# Patient Record
Sex: FEMALE | Race: WHITE | NOT HISPANIC OR LATINO | Employment: UNEMPLOYED | URBAN - METROPOLITAN AREA
[De-identification: names, ages, dates, MRNs, and addresses within clinical notes are randomized per-mention and may not be internally consistent; named-entity substitution may affect disease eponyms.]

---

## 2017-03-28 ENCOUNTER — PROCEDURE VISIT (OUTPATIENT)
Dept: NEUROLOGY | Facility: CLINIC | Age: 32
End: 2017-03-28

## 2017-03-28 DIAGNOSIS — M79.642 BILATERAL HAND PAIN: Primary | ICD-10-CM

## 2017-03-28 DIAGNOSIS — M79.641 BILATERAL HAND PAIN: Primary | ICD-10-CM

## 2017-03-28 PROCEDURE — 95886 MUSC TEST DONE W/N TEST COMP: CPT | Performed by: PSYCHIATRY & NEUROLOGY

## 2017-03-28 PROCEDURE — 95910 NRV CNDJ TEST 7-8 STUDIES: CPT | Performed by: PSYCHIATRY & NEUROLOGY

## 2017-04-24 ENCOUNTER — HOSPITAL ENCOUNTER (OUTPATIENT)
Facility: HOSPITAL | Age: 32
Setting detail: OBSERVATION
Discharge: HOME | End: 2017-04-25
Attending: STUDENT IN AN ORGANIZED HEALTH CARE EDUCATION/TRAINING PROGRAM | Admitting: FAMILY MEDICINE

## 2017-04-24 DIAGNOSIS — T43.014A: Primary | ICD-10-CM

## 2017-04-24 DIAGNOSIS — E66.01 MORBID OBESITY, UNSPECIFIED OBESITY TYPE (HCC): ICD-10-CM

## 2017-04-24 LAB
ALBUMIN SERPL-MCNC: 4.1 G/DL (ref 3.5–5)
ALBUMIN/GLOB SERPL: 1.3 G/DL (ref 1–2)
ALP SERPL-CCNC: 97 U/L (ref 38–126)
ALT SERPL W P-5'-P-CCNC: 33 U/L (ref 13–69)
AMPHET+METHAMPHET UR QL: NEGATIVE
AMPHETAMINES UR QL: NEGATIVE
ANION GAP SERPL CALCULATED.3IONS-SCNC: 12.6 MMOL/L
APAP SERPL-MCNC: <10 MCG/ML
AST SERPL-CCNC: 29 U/L (ref 15–46)
BARBITURATES UR QL SCN: NEGATIVE
BASOPHILS # BLD AUTO: 0.03 10*3/MM3 (ref 0–0.2)
BASOPHILS NFR BLD AUTO: 0.3 % (ref 0–2.5)
BENZODIAZ UR QL SCN: NEGATIVE
BILIRUB SERPL-MCNC: 0.5 MG/DL (ref 0.2–1.3)
BUN BLD-MCNC: 10 MG/DL (ref 7–20)
BUN/CREAT SERPL: 11.1 (ref 7.1–23.5)
BUPRENORPHINE SERPL-MCNC: NEGATIVE NG/ML
CALCIUM SPEC-SCNC: 9 MG/DL (ref 8.4–10.2)
CANNABINOIDS SERPL QL: NEGATIVE
CHLORIDE SERPL-SCNC: 101 MMOL/L (ref 98–107)
CO2 SERPL-SCNC: 29 MMOL/L (ref 26–30)
COCAINE UR QL: NEGATIVE
CREAT BLD-MCNC: 0.9 MG/DL (ref 0.6–1.3)
DEPRECATED RDW RBC AUTO: 45.5 FL (ref 37–54)
EOSINOPHIL # BLD AUTO: 0.07 10*3/MM3 (ref 0–0.7)
EOSINOPHIL NFR BLD AUTO: 0.8 % (ref 0–7)
ERYTHROCYTE [DISTWIDTH] IN BLOOD BY AUTOMATED COUNT: 14.4 % (ref 11.5–14.5)
ETHANOL BLD-MCNC: <10 MG/DL
ETHANOL UR QL: <0.01 %
GFR SERPL CREATININE-BSD FRML MDRD: 72 ML/MIN/1.73
GLOBULIN UR ELPH-MCNC: 3.1 GM/DL
GLUCOSE BLD-MCNC: 128 MG/DL (ref 74–98)
HCT VFR BLD AUTO: 37 % (ref 37–47)
HGB BLD-MCNC: 11.9 G/DL (ref 12–16)
HOLD SPECIMEN: NORMAL
HOLD SPECIMEN: NORMAL
IMM GRANULOCYTES # BLD: 0.04 10*3/MM3 (ref 0–0.06)
IMM GRANULOCYTES NFR BLD: 0.4 % (ref 0–0.6)
LYMPHOCYTES # BLD AUTO: 2.89 10*3/MM3 (ref 0.6–3.4)
LYMPHOCYTES NFR BLD AUTO: 31 % (ref 10–50)
MCH RBC QN AUTO: 28.4 PG (ref 27–31)
MCHC RBC AUTO-ENTMCNC: 32.2 G/DL (ref 30–37)
MCV RBC AUTO: 88.3 FL (ref 81–99)
METHADONE UR QL SCN: NEGATIVE
MONOCYTES # BLD AUTO: 0.63 10*3/MM3 (ref 0–0.9)
MONOCYTES NFR BLD AUTO: 6.8 % (ref 0–12)
NEUTROPHILS # BLD AUTO: 5.67 10*3/MM3 (ref 2–6.9)
NEUTROPHILS NFR BLD AUTO: 60.7 % (ref 37–80)
NRBC BLD MANUAL-RTO: 0 /100 WBC (ref 0–0)
OPIATES UR QL: NEGATIVE
OXYCODONE UR QL SCN: NEGATIVE
PCP UR QL SCN: NEGATIVE
PLATELET # BLD AUTO: 338 10*3/MM3 (ref 130–400)
PMV BLD AUTO: 9.3 FL (ref 6–12)
POTASSIUM BLD-SCNC: 3.6 MMOL/L (ref 3.5–5.1)
PROPOXYPH UR QL: NEGATIVE
PROT SERPL-MCNC: 7.2 G/DL (ref 6.3–8.2)
RBC # BLD AUTO: 4.19 10*6/MM3 (ref 4.2–5.4)
SALICYLATES SERPL-MCNC: <1 MG/DL (ref 2.8–20)
SODIUM BLD-SCNC: 139 MMOL/L (ref 137–145)
TRICYCLICS UR QL SCN: POSITIVE
WBC NRBC COR # BLD: 9.33 10*3/MM3 (ref 4.8–10.8)
WHOLE BLOOD HOLD SPECIMEN: NORMAL
WHOLE BLOOD HOLD SPECIMEN: NORMAL

## 2017-04-24 PROCEDURE — 80307 DRUG TEST PRSMV CHEM ANLYZR: CPT | Performed by: STUDENT IN AN ORGANIZED HEALTH CARE EDUCATION/TRAINING PROGRAM

## 2017-04-24 PROCEDURE — 93005 ELECTROCARDIOGRAM TRACING: CPT | Performed by: STUDENT IN AN ORGANIZED HEALTH CARE EDUCATION/TRAINING PROGRAM

## 2017-04-24 PROCEDURE — 85025 COMPLETE CBC W/AUTO DIFF WBC: CPT | Performed by: STUDENT IN AN ORGANIZED HEALTH CARE EDUCATION/TRAINING PROGRAM

## 2017-04-24 PROCEDURE — 80306 DRUG TEST PRSMV INSTRMNT: CPT | Performed by: STUDENT IN AN ORGANIZED HEALTH CARE EDUCATION/TRAINING PROGRAM

## 2017-04-24 PROCEDURE — 80053 COMPREHEN METABOLIC PANEL: CPT | Performed by: STUDENT IN AN ORGANIZED HEALTH CARE EDUCATION/TRAINING PROGRAM

## 2017-04-24 PROCEDURE — 99285 EMERGENCY DEPT VISIT HI MDM: CPT

## 2017-04-24 PROCEDURE — 96361 HYDRATE IV INFUSION ADD-ON: CPT

## 2017-04-24 PROCEDURE — 96365 THER/PROPH/DIAG IV INF INIT: CPT

## 2017-04-24 RX ORDER — SODIUM CHLORIDE 0.9 % (FLUSH) 0.9 %
10 SYRINGE (ML) INJECTION AS NEEDED
Status: DISCONTINUED | OUTPATIENT
Start: 2017-04-24 | End: 2017-04-25 | Stop reason: HOSPADM

## 2017-04-24 RX ADMIN — SODIUM CHLORIDE 1000 ML: 9 INJECTION, SOLUTION INTRAVENOUS at 19:46

## 2017-04-24 RX ADMIN — Medication 100 MEQ: at 20:33

## 2017-04-24 RX ADMIN — ACTIVATED CHARCOAL 50 G: 208 SUSPENSION ORAL at 20:00

## 2017-04-24 RX ADMIN — SODIUM BICARBONATE 50 MEQ: 84 INJECTION, SOLUTION INTRAVENOUS at 19:45

## 2017-04-24 RX ADMIN — SODIUM CHLORIDE 1000 ML: 9 INJECTION, SOLUTION INTRAVENOUS at 20:54

## 2017-04-24 RX ADMIN — SODIUM BICARBONATE 100 MEQ: 84 INJECTION, SOLUTION INTRAVENOUS at 20:33

## 2017-04-24 NOTE — ED PROVIDER NOTES
"Subjective   HPI Comments: Patient is a 31-year-old female with a history of schizophrenia and bipolar according to her who reports eating 20 x 50 mg amitriptyline tablets less than one hour before arrival.  Patient states she had a disagreement with family members and wasn't trying to kill herself, but wanted to \"show them \".  Patient states after she took them she made herself vomit in efforts to get the pills back up.  Patient denies past attempts of self-harm or suicide.      Review of Systems   All other systems reviewed and are negative.      Past Medical History:   Diagnosis Date   • Mental health disorder     multiple unknown possible bipolar, schitzoprenic       No Known Allergies    History reviewed. No pertinent surgical history.    History reviewed. No pertinent family history.    Social History     Social History   • Marital status:      Spouse name: N/A   • Number of children: N/A   • Years of education: N/A     Social History Main Topics   • Smoking status: Never Smoker   • Smokeless tobacco: None   • Alcohol use No   • Drug use: No   • Sexual activity: Not Asked     Other Topics Concern   • None     Social History Narrative   • None           Objective   Physical Exam   Nursing note and vitals reviewed.  GEN: No acute distress  Head: Normocephalic, atraumatic  Eyes: Pupils equal round reactive to light  ENT: Posterior pharynx normal in appearance, oral mucosa is moist  Chest: Nontender to palpation  Cardiovascular: Regular rate  Lungs: Clear to auscultation bilaterally  Abdomen: Soft, nontender, nondistended, no peritoneal signs  Extremities: No edema, normal appearance  Neuro: GCS 15  Psych: Denies suicidal or homicidal ideation.    Procedures         ED Course  ED Course   Comment By Time   EKG shows sinus tachycardia with a rate of 141.  PA interval is 144.  QTC is 364.  QRS is 90.  No significant ST segments.  This is an abnormal EKG.  At this time the EKG is not showing any evidence of " TCA overdose.  We have initiated gastric lavage as well as charcoal. Raul Pope MD 04/24 1934   Second EKG timed at 20/1/19 shows sinus tachycardia with a rate of 129.  TN interval is 150.  QTc is 382.  QRS is 94.  Still no changes related to TCA overdose. Raul Pope MD 04/24 2135   EKG shows sinus tachycardia with a rate of 136.  Right axis deviation.  TN interval is 126.  QTc is 382.  QRS is 90.  Still no changes related to tca overdose. Raul Pope MD 04/24 2259                  MDM  Number of Diagnoses or Management Options  Morbid obesity, unspecified obesity type:   TCA (tricyclic antidepressant) overdose of undetermined intent, initial encounter:   Diagnosis management comments: Gastric lavage performed due to the timeline.  Charcoal was given as well.  We did follow poison control directions and did give empiric sodium bicarbonate.  Patient has been here for greater than 4 hours at this time and EKG has shown no evidence of TCA toxicity.  Patient is somewhat sedated somewhat agitated but will wake up when prompted.  Patient has no significant laboratory abnormalities.  She will be monitored in ICU overnight and evaluated by psychiatry.  After speaking with patient's family appears that was an argument regarding one of the grandmothers try to take custody of patient's children.  I do not know total validity of this but didn't think this would be a useful piece of information to pass on to the next providers.       Amount and/or Complexity of Data Reviewed  Clinical lab tests: ordered and reviewed  Discussion of test results with the performing providers: yes  Decide to obtain previous medical records or to obtain history from someone other than the patient: yes  Obtain history from someone other than the patient: yes  Review and summarize past medical records: yes  Discuss the patient with other providers: yes  Independent visualization of images, tracings, or specimens: yes    Risk of  Complications, Morbidity, and/or Mortality  Presenting problems: high  Diagnostic procedures: high  Management options: high    Critical Care  Total time providing critical care: 30-74 minutes      Final diagnoses:   TCA (tricyclic antidepressant) overdose of undetermined intent, initial encounter   Morbid obesity, unspecified obesity type            Raul Pope MD  04/24/17 2739

## 2017-04-25 VITALS
OXYGEN SATURATION: 98 % | TEMPERATURE: 98.3 F | BODY MASS INDEX: 44.41 KG/M2 | DIASTOLIC BLOOD PRESSURE: 83 MMHG | HEIGHT: 68 IN | RESPIRATION RATE: 11 BRPM | SYSTOLIC BLOOD PRESSURE: 139 MMHG | WEIGHT: 293 LBS | HEART RATE: 114 BPM

## 2017-04-25 PROBLEM — T43.014A TCA (TRICYCLIC ANTIDEPRESSANT) OVERDOSE OF UNDETERMINED INTENT: Status: ACTIVE | Noted: 2017-04-25

## 2017-04-25 LAB
ARTERIAL PATENCY WRIST A: POSITIVE
ATMOSPHERIC PRESS: 728 MMHG
BACTERIA UR QL AUTO: ABNORMAL /HPF
BASE EXCESS BLDA CALC-SCNC: 8.2 MMOL/L
BDY SITE: ABNORMAL
BILIRUB UR QL STRIP: NEGATIVE
CLARITY UR: ABNORMAL
COLOR UR: ABNORMAL
GLUCOSE UR STRIP-MCNC: NEGATIVE MG/DL
HCO3 BLDA-SCNC: 31.5 MMOL/L (ref 22–28)
HGB BLDA-MCNC: 10.7 G/DL (ref 12–18)
HGB UR QL STRIP.AUTO: ABNORMAL
HOROWITZ INDEX BLD+IHG-RTO: 21 %
HYALINE CASTS UR QL AUTO: ABNORMAL /LPF
KETONES UR QL STRIP: NEGATIVE
LEUKOCYTE ESTERASE UR QL STRIP.AUTO: ABNORMAL
MODALITY: ABNORMAL
NITRITE UR QL STRIP: NEGATIVE
PCO2 BLDA: 41.1 MM HG (ref 35–45)
PH BLDA: 7.49 PH UNITS
PH UR STRIP.AUTO: 8.5 [PH] (ref 5–8)
PO2 BLDA: 74.8 MM HG (ref 75–100)
PROT UR QL STRIP: ABNORMAL
RBC # UR: ABNORMAL /HPF
REF LAB TEST METHOD: ABNORMAL
SAO2 % BLDCOA: 95.2 %
SP GR UR STRIP: 1.02 (ref 1–1.03)
SQUAMOUS #/AREA URNS HPF: ABNORMAL /HPF
UROBILINOGEN UR QL STRIP: ABNORMAL
WBC CLUMPS # UR AUTO: ABNORMAL /HPF
WBC UR QL AUTO: ABNORMAL /HPF

## 2017-04-25 PROCEDURE — 25810000003 SODIUM CHLORIDE 0.9 % WITH KCL 20 MEQ 20-0.9 MEQ/L-% SOLUTION: Performed by: FAMILY MEDICINE

## 2017-04-25 PROCEDURE — 87077 CULTURE AEROBIC IDENTIFY: CPT | Performed by: FAMILY MEDICINE

## 2017-04-25 PROCEDURE — 87086 URINE CULTURE/COLONY COUNT: CPT | Performed by: FAMILY MEDICINE

## 2017-04-25 PROCEDURE — 96375 TX/PRO/DX INJ NEW DRUG ADDON: CPT

## 2017-04-25 PROCEDURE — 82805 BLOOD GASES W/O2 SATURATION: CPT

## 2017-04-25 PROCEDURE — G0378 HOSPITAL OBSERVATION PER HR: HCPCS

## 2017-04-25 PROCEDURE — 25010000002 CEFTRIAXONE: Performed by: FAMILY MEDICINE

## 2017-04-25 PROCEDURE — 87081 CULTURE SCREEN ONLY: CPT | Performed by: FAMILY MEDICINE

## 2017-04-25 PROCEDURE — 81001 URINALYSIS AUTO W/SCOPE: CPT | Performed by: FAMILY MEDICINE

## 2017-04-25 PROCEDURE — 36600 WITHDRAWAL OF ARTERIAL BLOOD: CPT

## 2017-04-25 PROCEDURE — 87186 SC STD MICRODIL/AGAR DIL: CPT | Performed by: FAMILY MEDICINE

## 2017-04-25 PROCEDURE — 99235 HOSP IP/OBS SAME DATE MOD 70: CPT | Performed by: FAMILY MEDICINE

## 2017-04-25 RX ORDER — OMEPRAZOLE 40 MG/1
40 CAPSULE, DELAYED RELEASE ORAL DAILY
COMMUNITY
End: 2018-06-26

## 2017-04-25 RX ORDER — ACETAMINOPHEN 650 MG/1
650 SUPPOSITORY RECTAL EVERY 4 HOURS PRN
Status: DISCONTINUED | OUTPATIENT
Start: 2017-04-25 | End: 2017-04-25 | Stop reason: HOSPADM

## 2017-04-25 RX ORDER — AMITRIPTYLINE HYDROCHLORIDE 50 MG/1
150 TABLET, FILM COATED ORAL NIGHTLY
COMMUNITY
End: 2017-04-25 | Stop reason: HOSPADM

## 2017-04-25 RX ORDER — CHOLECALCIFEROL (VITAMIN D3) 125 MCG
10 CAPSULE ORAL NIGHTLY PRN
COMMUNITY

## 2017-04-25 RX ORDER — CEFDINIR 300 MG/1
300 CAPSULE ORAL 2 TIMES DAILY
Qty: 10 CAPSULE | Refills: 0 | Status: SHIPPED | OUTPATIENT
Start: 2017-04-25 | End: 2018-03-23 | Stop reason: ALTCHOICE

## 2017-04-25 RX ORDER — SODIUM CHLORIDE AND POTASSIUM CHLORIDE 150; 900 MG/100ML; MG/100ML
150 INJECTION, SOLUTION INTRAVENOUS CONTINUOUS
Status: DISCONTINUED | OUTPATIENT
Start: 2017-04-25 | End: 2017-04-25 | Stop reason: HOSPADM

## 2017-04-25 RX ORDER — DULOXETIN HYDROCHLORIDE 60 MG/1
120 CAPSULE, DELAYED RELEASE ORAL DAILY
COMMUNITY
End: 2018-06-26

## 2017-04-25 RX ORDER — SIMVASTATIN 10 MG
10 TABLET ORAL NIGHTLY
COMMUNITY
End: 2018-06-26

## 2017-04-25 RX ORDER — LISINOPRIL 5 MG/1
5 TABLET ORAL DAILY
COMMUNITY
End: 2018-06-26

## 2017-04-25 RX ORDER — ACETAMINOPHEN 325 MG/1
650 TABLET ORAL EVERY 4 HOURS PRN
Status: DISCONTINUED | OUTPATIENT
Start: 2017-04-25 | End: 2017-04-25 | Stop reason: HOSPADM

## 2017-04-25 RX ORDER — FAMOTIDINE 10 MG/ML
20 INJECTION, SOLUTION INTRAVENOUS 2 TIMES DAILY
Status: DISCONTINUED | OUTPATIENT
Start: 2017-04-25 | End: 2017-04-25 | Stop reason: HOSPADM

## 2017-04-25 RX ORDER — LAMOTRIGINE 100 MG/1
300 TABLET ORAL DAILY
COMMUNITY
End: 2018-06-26

## 2017-04-25 RX ORDER — SODIUM CHLORIDE 0.9 % (FLUSH) 0.9 %
1-10 SYRINGE (ML) INJECTION AS NEEDED
Status: DISCONTINUED | OUTPATIENT
Start: 2017-04-25 | End: 2017-04-25 | Stop reason: HOSPADM

## 2017-04-25 RX ORDER — HYDROCHLOROTHIAZIDE 25 MG/1
25 TABLET ORAL DAILY
COMMUNITY
End: 2018-06-26

## 2017-04-25 RX ORDER — FAMOTIDINE 20 MG/1
20 TABLET, FILM COATED ORAL 2 TIMES DAILY
COMMUNITY
End: 2018-06-26

## 2017-04-25 RX ADMIN — CEFTRIAXONE 1 G: 1 INJECTION, POWDER, FOR SOLUTION INTRAMUSCULAR; INTRAVENOUS at 06:10

## 2017-04-25 RX ADMIN — POTASSIUM CHLORIDE AND SODIUM CHLORIDE 150 ML/HR: 900; 150 INJECTION, SOLUTION INTRAVENOUS at 10:36

## 2017-04-25 RX ADMIN — FAMOTIDINE 20 MG: 10 INJECTION, SOLUTION INTRAVENOUS at 09:06

## 2017-04-25 RX ADMIN — POTASSIUM CHLORIDE AND SODIUM CHLORIDE 150 ML/HR: 900; 150 INJECTION, SOLUTION INTRAVENOUS at 03:34

## 2017-04-25 NOTE — DISCHARGE SUMMARY
"    [facility]   DISCHARGE SUMMARY      Name:  Jane Smith   Age:  31 y.o.  Sex:  female  :  1985  MRN:  [id number]   Visit Number:  [id number]  Primary Care Physician:  No Known Provider  Date of Discharge:  2017  Admission Date:  2017      Discharge Diagnosis:     Active Problems:  TCA (tricyclic antidepressant) overdose of undetermined intent  Possible suicide attempt  Acute toxic encephalopathy, secondary to drug overdose   Chronic essential hypertension  Reported \"schizophrenia and bipolar disorder\"   Obesity  Hyperlipidemia  UTI, with hematuria present on admission, unknown organism      Active Hospital Problems (** Indicates Principal Problem)    Diagnosis Date Noted   • TCA (tricyclic antidepressant) overdose of undetermined intent [T43.014A] 2017      Resolved Hospital Problems    Diagnosis Date Noted Date Resolved   No resolved problems to display.         Presenting Problem/History of Present Illness:    TCA (tricyclic antidepressant) overdose of undetermined intent, initial encounter [T43.014A]         Hospital Course:    The patient is a 31-year-old  lady who presented to the emergency room with drug overdose. History is obtained from the medical chart secondary to the patient being hypersomnolent and no family at the bedside. The patient had reported taking 20 of her 50 mg amitriptyline tablets, despite being prescribed only 3 at bedtime, approximately 1 hour prior to arrival. Apparently she had a disagreement with family members and denied suicidal ideation with just wanted to \"show them\". The patient has reported history of manipulative behavior and reports a history of schizophrenia and bipolar disorder. Immediately after taking the medication at home, she made herself vomit with efforts to vomit them back up. She had denied past suicidal ideation or self harm.      In the ER, poison control contacted. She was given 3 amp bicarb and " was hitting her arms and legs on the bed, unsafely, requiring violent four point restraints. She was given 2 liter normal saline bolus and received gastric lavage with charcoal. Repeat EKG normal. She was hypersomnolent and not able to have further psychiatric evaluation and will be admitted to hospitalist service for further evaluation first. Patient admitted to ICU.     Patient was continued on IV fluids.  Her mental status did improve.  She was seen later in the morning, and was alert and oriented ×4, and cooperative.  She denies any current suicidal ideation.  She denies any chest pressure, shortness breath, nausea, vomiting, or pain.  Her rhythm is stable with no QRS widening.  She does have sinus rhythm at a rate about 100.  She is eating, and ambulating without difficulty.  Behavioral [facility] saw the patient and recommended following up with her psychiatrist as well as having a follow-up appointment with behavioral health.  Inpatient treatment will not be required in this patient.  Behavioral health does not feel that she is suicidal at this point.  She would be discharged home today to the care of her family.  She should hold off on Elavil until seeing her primary care physician or her psychiatrist.  She does have a UTI, she was on Rocephin, will transition to oral Cefdinir.  Cultures are pending at this point.               Consults:     Behavioral health consult  Pertinent Test Results:     Lab Results (all)     Procedure Component Value Units Date/Time    CBC & Differential [[id number]] Collected:  04/24/17 1929    Specimen:  Blood Updated:  04/24/17 1940    Narrative:       The following orders were created for panel order CBC & Differential.  Procedure                               Abnormality         Status                     ---------                               -----------         ------                     CBC Auto Differential[[id number]]         Abnormal            Final result                  Please view results for these tests on the individual orders.    CBC Auto Differential [[id number]]  (Abnormal) Collected:  04/24/17 1929    Specimen:  Blood Updated:  04/24/17 1940     WBC 9.33 10*3/mm3      RBC 4.19 (L) 10*6/mm3      Hemoglobin 11.9 (L) g/dL      Hematocrit 37.0 %      MCV 88.3 fL      MCH 28.4 pg      MCHC 32.2 g/dL      RDW 14.4 %      RDW-SD 45.5 fl      MPV 9.3 fL      Platelets 338 10*3/mm3      Neutrophil % 60.7 %      Lymphocyte % 31.0 %      Monocyte % 6.8 %      Eosinophil % 0.8 %      Basophil % 0.3 %      Immature Grans % 0.4 %      Neutrophils, Absolute 5.67 10*3/mm3      Lymphocytes, Absolute 2.89 10*3/mm3      Monocytes, Absolute 0.63 10*3/mm3      Eosinophils, Absolute 0.07 10*3/mm3      Basophils, Absolute 0.03 10*3/mm3      Immature Grans, Absolute 0.04 10*3/mm3      nRBC 0.0 /100 WBC     Comprehensive Metabolic Panel [[id number]]  (Abnormal) Collected:  04/24/17 1929    Specimen:  Blood Updated:  04/24/17 2006     Glucose 128 (H) mg/dL      BUN 10 mg/dL      Creatinine 0.90 mg/dL      Sodium 139 mmol/L      Potassium 3.6 mmol/L      Chloride 101 mmol/L      CO2 29.0 mmol/L      Calcium 9.0 mg/dL      Total Protein 7.2 g/dL      Albumin 4.10 g/dL      ALT (SGPT) 33 U/L      AST (SGOT) 29 U/L      Alkaline Phosphatase 97 U/L      Total Bilirubin 0.5 mg/dL      eGFR Non African Amer 72 mL/min/1.73      Globulin 3.1 gm/dL      A/G Ratio 1.3 g/dL      BUN/Creatinine Ratio 11.1     Anion Gap 12.6 mmol/L     Narrative:       Abnormal estimated GFR should be followed by more specific studies to confirm end stage chronic renal disease. The equation used for calculation may not be accurate for patients less than 19 years old, greater than 70 years old, patients at extremes of weight, malnutrition, or with acute renal dysfunction.    Acetaminophen Level [[id number]]  (Normal) Collected:  04/24/17 1929    Specimen:  Blood Updated:  04/24/17 2006     Acetaminophen <10.0 mcg/mL     Narrative:        Toxic = Greater than 150 mcg/mL    Ethanol [[id number]] Collected:  04/24/17 1929    Specimen:  Blood Updated:  04/24/17 2006     Ethanol <10 mg/dL      Ethanol % <0.010 %     Narrative:       This result is for medical use only and should not be used for forensic purposes.    Salicylate Level [[id number]]  (Abnormal) Collected:  04/24/17 1929    Specimen:  Blood Updated:  04/24/17 2006     Salicylate <1.0 (L) mg/dL     Kim Draw [[id number]] Collected:  04/24/17 1929    Specimen:  Blood Updated:  04/24/17 2101    Narrative:       The following orders were created for panel order Kim Draw.  Procedure                               Abnormality         Status                     ---------                               -----------         ------                     Light Blue Top[[id number]]                                    Final result               Lavender Top[[id number]]                                      Final result               Gold Top - SST[[id number]]                                    Final result               Green Top (No Gel)[[id number]]                                Final result                 Please view results for these tests on the individual orders.    Light Blue Top [[id number]] Collected:  04/24/17 1929    Specimen:  Blood Updated:  04/24/17 2101     Extra Tube hold for add-on      Auto resulted       Lavender Top [[id number]] Collected:  04/24/17 1929    Specimen:  Blood Updated:  04/24/17 2101     Extra Tube hold for add-on      Auto resulted       Gold Top - SST [[id number]] Collected:  04/24/17 1929    Specimen:  Blood Updated:  04/24/17 2101     Extra Tube Hold for add-ons.      Auto resulted.       Green Top (No Gel) [[id number]] Collected:  04/24/17 1929    Specimen:  Blood Updated:  04/24/17 2101     Extra Tube Hold for add-ons.      Auto resulted.       Urine Drug Screen [[id number]]  (Abnormal) Collected:  04/24/17 2240    Specimen:  Urine from Urine, Clean Catch Updated:  04/24/17 2317      THC, Screen, Urine Negative     Phencyclidine (PCP), Urine Negative     Cocaine Screen, Urine Negative     Methamphetamine, Urine Negative     Opiate Screen Negative     Amphetamine Screen, Urine Negative     Benzodiazepine Screen, Urine Negative     Tricyclic Antidepressants Screen Positive (A)     Methadone Screen, Urine Negative     Barbiturates Screen, Urine Negative     Oxycodone Screen, Urine Negative     Propoxyphene Screen Negative     Buprenorphine, Screen, Urine Negative    Narrative:       Limitations of this procedure include the possibility of false positives due to interfering substances in the urine sample. Clinical data should be correlated with any questionable result. Positive results should be considered Presumptive Positive until results are confirmed with another methodology such as HPLC or GCMS.    Blood Gas, Arterial [[id number]]  (Abnormal) Collected:  04/25/17 0216    Specimen:  Arterial Blood Updated:  04/25/17 0217     Site Arterial: right radial     Brady's Test Positive     pH, Arterial 7.492 pH units      pCO2, Arterial 41.1 mm Hg      pO2, Arterial 74.8 (L) mm Hg      HCO3, Arterial 31.5 (H) mmol/L      Base Excess, Arterial 8.2 mmol/L      O2 Saturation, Arterial 95.2 %      Hemoglobin, Blood Gas 10.7 (L) g/dL      Barometric Pressure for Blood Gas 728 mmHg      Modality Room air     FIO2 21 %     MRSA Screen Culture [[id number]] Collected:  04/25/17 0059    Specimen:  Swab from Nares Updated:  04/25/17 0243    Urinalysis With / Microscopic If Indicated [[id number]]  (Abnormal) Collected:  04/25/17 0246    Specimen:  Urine from Urine, Clean Catch Updated:  04/25/17 0422     Color, UA Dark Yellow (A)     Appearance, UA Turbid (A)     pH, UA 8.5 (H)     Specific Gravity, UA 1.020     Glucose, UA Negative     Ketones, UA Negative     Bilirubin, UA Negative     Blood, UA Large (3+) (A)     Protein, UA >=300 mg/dL (3+) (A)     Leuk Esterase, UA Moderate (2+) (A)     Nitrite, UA Negative      "Urobilinogen, UA 2.0 E.U./dL (A)    Urinalysis, Microscopic Only [[id number]]  (Abnormal) Collected:  04/25/17 0246    Specimen:  Urine from Urine, Clean Catch Updated:  04/25/17 0429     RBC, UA 13-20 (A) /HPF      WBC, UA 31-50 (A) /HPF      Bacteria, UA 2+ (A) /HPF      Squamous Epithelial Cells, UA 0-2 /HPF      Hyaline Casts, UA None Seen /LPF      WBC Clumps, UA Moderate/2+ /HPF      Methodology Manual Light Microscopy    Urine Culture [[id number]] Collected:  04/25/17 0246    Specimen:  Urine from Urine, Clean Catch Updated:  04/25/17 0447          Imaging Results (all)     None          Condition on Discharge:      stable    Vital Signs:    /74  Pulse 113  Temp 98.3 °F (36.8 °C) (Oral)   Resp 10  Ht 68\" (172.7 cm)  Wt (!) 363 lb 12.1 oz (165 kg)  SpO2 100%  BMI 55.31 kg/m2    Physical Exam:      General Appearance:    Alert, cooperative, in no acute distress, obese   Head:    Normocephalic, without obvious abnormality, atraumatic   Eyes:            Lids and lashes normal, conjunctivae and sclerae normal, no   icterus, no pallor, corneas clear, PERRLA   Ears:    Ears appear intact with no abnormalities noted   Throat:   No oral lesions, no thrush, oral mucosa moist   Neck:   No adenopathy, supple, trachea midline, no thyromegaly, no     carotid bruit, no JVD   Back:     No kyphosis present, no scoliosis present, no skin lesions,       erythema or scars, no tenderness to percussion or                   palpation,   range of motion normal   Lungs:     Clear to auscultation,respirations regular, even and                   unlabored    Heart:    Regular rhythm and normal rate, normal S1 and S2, no            murmur, no gallop, no rub, no click   Breast Exam:    Deferred   Abdomen:     Normal bowel sounds, no masses, no organomegaly, soft        non-tender, non-distended, no guarding, no rebound                 tenderness   Genitalia:    Deferred   Extremities:   Moves all extremities well, no edema, no " cyanosis, no              redness   Pulses:   Pulses palpable and equal bilaterally   Skin:   No bleeding, bruising or rash   Lymph nodes:   No palpable adenopathy   Neurologic:   Cranial nerves 2 - 12 grossly intact, sensation intact, DTR        present and equal bilaterally       Discharge Disposition:    Home or Self Care    Discharge Medication:     Jane Smith   Home Medication Instructions MARGRET:[id number]    Printed on:04/25/17 1250   Medication Information                      cefdinir (OMNICEF) 300 MG capsule  Take 1 capsule by mouth 2 (Two) Times a Day.             DULoxetine (CYMBALTA) 60 MG capsule  Take 120 mg by mouth Daily.             famotidine (PEPCID) 20 MG tablet  Take 20 mg by mouth 2 (Two) Times a Day.             hydrochlorothiazide (HYDRODIURIL) 25 MG tablet  Take 25 mg by mouth Daily.             lamoTRIgine (LaMICtal) 100 MG tablet  Take 300 mg by mouth Daily.             lisinopril (PRINIVIL,ZESTRIL) 5 MG tablet  Take 5 mg by mouth Daily.             melatonin 5 MG tablet tablet  Take 10 mg by mouth At Night As Needed.             omeprazole (priLOSEC) 40 MG capsule  Take 40 mg by mouth Daily.             simvastatin (ZOCOR) 10 MG tablet  Take 10 mg by mouth Every Night.             Hold Elavil until see primary care physician, or primary psychiatrist.     Discharge Diet:     regular    Activity at Discharge:     as tolerated    Follow-up Appointments:    Future Appointments  Date Time Provider Department Center   7/17/2017 1:45 PM JOSE LUIS Xie MARILEE LUIS ANTONIO None         Test Results Pending at Discharge:     Order Current Status    MRSA Screen Culture In process    Urine Culture In process             Rafael Del Angel DO  04/25/17  12:50 PM

## 2017-04-25 NOTE — PLAN OF CARE
Problem: Suicide Risk (Adult,Obstetrics,Pediatric)  Goal: Identify Related Risk Factors and Signs and Symptoms  Outcome: Ongoing (interventions implemented as appropriate)    04/25/17 0500   Suicide Risk   Suicide Risk: Related Risk Factors co-occurring disorders;mental health diagnosis;multiple stressors;stressful life event       Goal: Strength-Based Wellness/Recovery  Outcome: Ongoing (interventions implemented as appropriate)  Goal: Physical Safety  Outcome: Ongoing (interventions implemented as appropriate)

## 2017-04-25 NOTE — PLAN OF CARE
Problem: Patient Care Overview (Adult)  Goal: Plan of Care Review  Outcome: Ongoing (interventions implemented as appropriate)    04/25/17 0121   Coping/Psychosocial Response Interventions   Plan Of Care Reviewed With patient   Patient Care Overview   Progress no change   Outcome Evaluation   Outcome Summary/Follow up Plan Pt very lethargic and confused at this time. Vital signs stable. Will continue to monitor.          Problem: Overdose, Ingestion/Inhalants (Adult)  Goal: Signs and Symptoms of Listed Potential Problems Will be Absent or Manageable (Overdose, Ingestion/Inhalants)  Outcome: Ongoing (interventions implemented as appropriate)

## 2017-04-25 NOTE — ED NOTES
Patient remains very irritable, kicking legs against stretcher and trying to pull herself out of restraints. Patient voided on self. Cleaned patient applied new linens. MD aware of patient status. Nurse unable to leave bedside. Verbal order for restraints to lower extremities as well.      Lorin Urbano RN  04/24/17 3255

## 2017-04-25 NOTE — H&P
"    [facility]      Name:  Jane Smith   Age:  31 y.o.  Sex:  female  :  1985  MRN:  [id number]   Visit Number:  [id number]  Admission Date:  2017  Date Of Service:  2017  Primary Care Physician:  No Known Provider    Chief Complaint: drug overdose        History Of Presenting Illness:  The patient is a 31-year-old  lady who presented to the emergency room with drug overdose.  History is obtained from the medical chart secondary to the patient being hypersomnolent and no family at the bedside.  The patient had reported taking 20 of her 50 mg amitriptyline tablets, despite being prescribed only 3 at bedtime, approximately 1 hour prior to arrival.  Apparently she had a disagreement with family members and denied suicidal ideation with just wanted to \"show them\". The patient has reported history of manipulative behavior and reports a history of schizophrenia and bipolar disorder.  Immediately after taking the medication at home, she made herself vomit with efforts to vomit them back up. She had denied past suicidal ideation or self harm.     In the ER, poison control contacted. She was given 3 amp bicarb and was hitting her arms and legs on the bed, unsafely, requiring violent four point restraints. She was given 2 liter normal saline bolus and received gastric lavage with charcoal. Repeat EKG normal. She was hypersomnolent and not able to have further psychiatric evaluation and will be admitted to hospitalist service for further evaluation first. Patient admitted to ICU.         Review Of Systems: Unable to obtain accurately     General ROS: Patient denies any fevers, chills or loss of consciousness. Complains of generalized weakness.   Psychological ROS: Denies any hallucinations and delusions.  Ophthalmic ROS: Denies any diplopia or transient loss of vision.  ENT ROS: Denies sore throat, nasal congestion or ear pain.   Allergy and " Immunology ROS: Denies rash or itching.  Hematological and Lymphatic ROS: Denies neck swelling or easy bleeding.  Endocrine ROS: Denies any recent unintentional weight gain or loss.  Breast ROS: Denies any pain or swelling.  Respiratory ROS: Denies cough or shortness of breath.   Cardiovascular ROS: Denies chest pain or palpitations. No history of exertional chest pain.  Gastrointestinal ROS: Denies nausea and vomiting. Denies any abdominal pain. No diarrhea.  Genito-Urinary ROS: Denies dysuria or hematuria.  Musculoskeletal ROS: Denies back pain. No muscle pain. No calf pain.   Neurological ROS: Denies any focal weakness. No loss of consciousness. Denies any numbness. Denies neck pain.   Dermatological ROS: Denies any redness or pruritis.       Past Medical History:    Past Medical History:   Diagnosis Date   • Mental health disorder     multiple unknown possible bipolar, schitzoprenic   Hypertension  Hyperlipidemia    Past Surgical history:    History reviewed. No pertinent surgical history.    Social History:  Pediatric History   Patient Guardian Status   • Not on file.     Other Topics Concern   • Not on file     Social History Narrative       Family History:    History reviewed. No pertinent family history.    Allergies:      Review of patient's allergies indicates no known allergies.    Home Medications:    Prior to Admission Medications     Prescriptions Last Dose Informant Patient Reported? Taking?    amitriptyline (ELAVIL) 50 MG tablet   Yes Yes    Take 150 mg by mouth Every Night.    DULoxetine (CYMBALTA) 60 MG capsule   Yes Yes    Take 120 mg by mouth Daily.    famotidine (PEPCID) 20 MG tablet   Yes Yes    Take 20 mg by mouth 2 (Two) Times a Day.    hydrochlorothiazide (HYDRODIURIL) 25 MG tablet   Yes Yes    Take 25 mg by mouth Daily.    lamoTRIgine (LaMICtal) 100 MG tablet   Yes Yes    Take 300 mg by mouth Daily.    lisinopril (PRINIVIL,ZESTRIL) 5 MG tablet   Yes Yes    Take 5 mg by mouth Daily.     melatonin 5 MG tablet tablet   Yes Yes    Take 10 mg by mouth At Night As Needed.    omeprazole (priLOSEC) 40 MG capsule   Yes Yes    Take 40 mg by mouth Daily.    simvastatin (ZOCOR) 10 MG tablet   Yes Yes    Take 10 mg by mouth Every Night.             ED Medications:    Medications   sodium chloride 0.9 % flush 10 mL (not administered)   sodium chloride 0.9 % flush 1-10 mL (not administered)   sodium chloride 0.9 % with KCl 20 mEq/L infusion (not administered)   acetaminophen (TYLENOL) tablet 650 mg (not administered)   acetaminophen (TYLENOL) suppository 650 mg (not administered)   famotidine (PEPCID) injection 20 mg (not administered)   activated charcoal-sorbitol (ACTIDOSE-SORBITOL) suspension 50 g (50 g Oral Given 4/24/17 2000)   sodium chloride 0.9 % bolus 1,000 mL (0 mL Intravenous Stopped 4/24/17 2054)   sodium bicarbonate injection 8.4% 50 mEq (50 mEq Intravenous Given 4/24/17 1945)   sodium bicarbonate injection 8.4% 100 mEq (100 mEq Intravenous Given 4/24/17 2033)   sodium chloride 0.9 % bolus 1,000 mL (0 mL Intravenous Stopped 4/25/17 0042)       Vital Signs:    Temp:  [99.1 °F (37.3 °C)] 99.1 °F (37.3 °C)  Heart Rate:  [114-151] 116  Resp:  [20] 20  BP: (100-159)/(52-99) 108/65      Intake/Output Summary (Last 24 hours) at 04/25/17 0236  Last data filed at 04/24/17 2054   Gross per 24 hour   Intake             1000 ml   Output                0 ml   Net             1000 ml       Last 3 weights    04/24/17 1907   Weight: 298 lb (135 kg)       Body mass index is 45.31 kg/(m^2).    Physical Exam:      General Appearance:    Alert to loud voice, and uncooperative requiring 4 point now soft, no acute distress, oriented to person   Head:    Atraumatic and normocephalic, without obvious defect.   Eyes:            PERRLA, conjunctivae and sclerae normal, no icterus. No pallor. Extra-occular movements are within normal limits.NG tube in place with charcoal given.   Ears:    Ears appear intact with no  abnormalities noted.   Throat:   No oral lesions, no thrush, oral mucosa dry.   Neck:   Supple, trachea midline, no thyromegaly, no carotid bruit.   Back:     No kyphoscoliosis present. No tenderness to palpation,   range of motion normal.   Lungs:     Chest shape is normal. Breath sounds heard bilaterally equally.  No crackles or wheezing. No Pleural rub or bronchial breathing.      Heart:    Normal S1 and S2, no murmur, no gallop, no rub. No JVD   Abdomen:     Normal bowel sounds, no masses, no organomegaly. Soft        non-tender, non-distended, no guarding, no rebound                Tenderness, obese   Extremities:   Moves all extremities well, no edema, no cyanosis, no             Clubbing, purple echymoses arms and legs after kicking and hitting herself on the bed    Pulses:   Pulses palpable and equal bilaterally   Skin:   No bleeding, bruising or rash   Lymph nodes:   No palpable adenopathy   Neurologic:   Cranial nerves 2 - 12 grossly intact, sensation intact, Motor power is normal and equal bilaterally.       EKG:    Sinus tach 120s to 130s no acute st changes on EKG, normal QT    Labs:    Lab Results (last 24 hours)     Procedure Component Value Units Date/Time    CBC & Differential [[id number]] Collected:  04/24/17 1929    Specimen:  Blood Updated:  04/24/17 1940    Narrative:       The following orders were created for panel order CBC & Differential.  Procedure                               Abnormality         Status                     ---------                               -----------         ------                     CBC Auto Differential[[id number]]         Abnormal            Final result                 Please view results for these tests on the individual orders.    CBC Auto Differential [[id number]]  (Abnormal) Collected:  04/24/17 1929    Specimen:  Blood Updated:  04/24/17 1940     WBC 9.33 10*3/mm3      RBC 4.19 (L) 10*6/mm3      Hemoglobin 11.9 (L) g/dL      Hematocrit 37.0 %      MCV 88.3 fL       MCH 28.4 pg      MCHC 32.2 g/dL      RDW 14.4 %      RDW-SD 45.5 fl      MPV 9.3 fL      Platelets 338 10*3/mm3      Neutrophil % 60.7 %      Lymphocyte % 31.0 %      Monocyte % 6.8 %      Eosinophil % 0.8 %      Basophil % 0.3 %      Immature Grans % 0.4 %      Neutrophils, Absolute 5.67 10*3/mm3      Lymphocytes, Absolute 2.89 10*3/mm3      Monocytes, Absolute 0.63 10*3/mm3      Eosinophils, Absolute 0.07 10*3/mm3      Basophils, Absolute 0.03 10*3/mm3      Immature Grans, Absolute 0.04 10*3/mm3      nRBC 0.0 /100 WBC     Comprehensive Metabolic Panel [[id number]]  (Abnormal) Collected:  04/24/17 1929    Specimen:  Blood Updated:  04/24/17 2006     Glucose 128 (H) mg/dL      BUN 10 mg/dL      Creatinine 0.90 mg/dL      Sodium 139 mmol/L      Potassium 3.6 mmol/L      Chloride 101 mmol/L      CO2 29.0 mmol/L      Calcium 9.0 mg/dL      Total Protein 7.2 g/dL      Albumin 4.10 g/dL      ALT (SGPT) 33 U/L      AST (SGOT) 29 U/L      Alkaline Phosphatase 97 U/L      Total Bilirubin 0.5 mg/dL      eGFR Non African Amer 72 mL/min/1.73      Globulin 3.1 gm/dL      A/G Ratio 1.3 g/dL      BUN/Creatinine Ratio 11.1     Anion Gap 12.6 mmol/L     Narrative:       Abnormal estimated GFR should be followed by more specific studies to confirm end stage chronic renal disease. The equation used for calculation may not be accurate for patients less than 19 years old, greater than 70 years old, patients at extremes of weight, malnutrition, or with acute renal dysfunction.    Acetaminophen Level [[id number]]  (Normal) Collected:  04/24/17 1929    Specimen:  Blood Updated:  04/24/17 2006     Acetaminophen <10.0 mcg/mL     Narrative:       Toxic = Greater than 150 mcg/mL    Ethanol [[id number]] Collected:  04/24/17 1929    Specimen:  Blood Updated:  04/24/17 2006     Ethanol <10 mg/dL      Ethanol % <0.010 %     Narrative:       This result is for medical use only and should not be used for forensic purposes.    Salicylate Level  [[id number]]  (Abnormal) Collected:  04/24/17 1929    Specimen:  Blood Updated:  04/24/17 2006     Salicylate <1.0 (L) mg/dL     [facility] [facility] [[id number]] Collected:  04/24/17 1929    Specimen:  Blood Updated:  04/24/17 2101    Narrative:       The following orders were created for panel order [facility].  Procedure                               Abnormality         Status                     ---------                               -----------         ------                     Light Blue Top[[id number]]                                    Final result               Lavender Top[[id number]]                                      Final result               Gold Top - SST[[id number]]                                    Final result               Green Top (No Gel)[[id number]]                                Final result                 Please view results for these tests on the individual orders.    Light Blue Top [[id number]] Collected:  04/24/17 1929    Specimen:  Blood Updated:  04/24/17 2101     Extra Tube hold for add-on      Auto resulted       Lavender Top [[id number]] Collected:  04/24/17 1929    Specimen:  Blood Updated:  04/24/17 2101     Extra Tube hold for add-on      Auto resulted       Gold Top - SST [[id number]] Collected:  04/24/17 1929    Specimen:  Blood Updated:  04/24/17 2101     Extra Tube Hold for add-ons.      Auto resulted.       Green Top (No Gel) [[id number]] Collected:  04/24/17 1929    Specimen:  Blood Updated:  04/24/17 2101     Extra Tube Hold for add-ons.      Auto resulted.       Urine Drug Screen [[id number]]  (Abnormal) Collected:  04/24/17 2240    Specimen:  Urine from Urine, Clean Catch Updated:  04/24/17 2317     THC, Screen, Urine Negative     Phencyclidine (PCP), Urine Negative     Cocaine Screen, Urine Negative     Methamphetamine, Urine Negative     Opiate Screen Negative     Amphetamine Screen, Urine Negative     Benzodiazepine Screen, Urine Negative     Tricyclic Antidepressants Screen Positive (A)     " Methadone Screen, Urine Negative     Barbiturates Screen, Urine Negative     Oxycodone Screen, Urine Negative     Propoxyphene Screen Negative     Buprenorphine, Screen, Urine Negative    Narrative:       Limitations of this procedure include the possibility of false positives due to interfering substances in the urine sample. Clinical data should be correlated with any questionable result. Positive results should be considered Presumptive Positive until results are confirmed with another methodology such as HPLC or GCMS.    Blood Gas, Arterial [[id number]]  (Abnormal) Collected:  04/25/17 0216    Specimen:  Arterial Blood Updated:  04/25/17 0217     Site Arterial: right radial     Brady's Test Positive     pH, Arterial 7.492 pH units      pCO2, Arterial 41.1 mm Hg      pO2, Arterial 74.8 (L) mm Hg      HCO3, Arterial 31.5 (H) mmol/L      Base Excess, Arterial 8.2 mmol/L      O2 Saturation, Arterial 95.2 %      Hemoglobin, Blood Gas 10.7 (L) g/dL      Barometric Pressure for Blood Gas 728 mmHg      Modality Room air     FIO2 21 %           Radiology:    Imaging Results (last 72 hours)     ** No results found for the last 72 hours. **          Assessment:  Active Problems:    TCA (tricyclic antidepressant) overdose of undetermined intent  Possible suicide attempt  Acute toxic encephalopathy, secondary to drug overdose   Chronic essential hypertension  Reported \"schizophrenia and bipolar disorder\"   Obesity  Hyperlipidemia  UTI, with hematuria present on admission, unknown organism      Plan:    Admit to ICU for close monitoring and four point soft restraints. She is sleepy but arousable but had not been following all commands, but would recognize her name and mumble before falling back asleep. She already received 2 liter normal saline bolus and continue maintenance fluids. Bicarb already given. Poison control recommended ABG and this will be performed now that she is more calm. She will be admitted for further " monitoring then once improved in the next 1-2 days, will need psychiatry evaluation. Further history will need to be obtained. She is full code. SCD and Pepcid. No family at bedside for my interview. Medication risks and benefits discussed. RAJNI pending. Hold other home medications for now in case she took additional other home medications.    Urinalysis came back later with result positive. Urine culture added. Ceftriaxone added.       Jodee Smith,   04/25/17  2:36 AM

## 2017-04-25 NOTE — PROGRESS NOTES
"Adult Nutrition  Assessment/PES    Patient Name:  Jane Smith  YOB: 1985  MRN: [id number]  Admit Date:  4/24/2017    Assessment Date:  4/25/2017        Reason for Assessment       04/25/17 1116    Reason for Assessment    Reason For Assessment/Visit diagnosis/disease state    Diagnosis Diagnosis    Cardiac Dyslipidemia;HTN    Gastrointestinal Nausea;Vomiting;Other (comment)   GI discomfort    Infectious Disease UTI    Neurological Metabolic encephalopathy    Psychosocial Schizophrenia;Other (comment)   Bipolar, possible suicide attempt    Substance Use Drug/illicit/recreational   Marijana abuse                Anthropometrics       04/25/17 1119    Anthropometrics    Height Method Stated    Height 172.7 cm (68\")    Weight Method Bed scale    Weight (!)  165 kg (363 lb 12.1 oz)    Ideal Body Weight (IBW)    Ideal Body Weight (IBW), Female 64.55    % Ideal Body Weight 256.16    Body Mass Index (BMI)    BMI (kg/m2) 55.42    BMI Grade greater than 40 - obesity grade III            Labs/Tests/Procedures/Meds       04/25/17 1123    Labs/Tests/Procedures/Meds    Labs/Tests Review Reviewed   High: Glu    Medication Review Reviewed, pertinent              Estimated/Assessed Needs       04/25/17 1124    Calculation Measurements    Weight Used For Calculations 64.5 kg (142 lb 4.9 oz)   IBW    Height Used for Calculations 1.727 m (5' 8\")    Estimated/Assessed Energy Needs    Energy Need Method [facility]    Age 31    [facility]) 1399    Activity Factors ([facility])  Confined to bed  1.2    Estimated Kcal Range  ~4682-1884 kcal    Estimated/Assessed Protein Needs    Weight Used for Protein Calculation 64.5 kg (142 lb 4.9 oz)    Protein (gm/kg) 1.5    1.5 Gm Protein (gm) 96.82    Estimated Protein Range ~77-97 gms    Estimated/Assessed Fluid Needs    Fluid Need Method RDA method    RDA Method (mL)  2000            Nutrition Prescription Ordered       04/25/17 1125    Nutrition " Prescription PO    Current PO Diet Regular            Evaluation of Received Nutrient/Fluid Intake       04/25/17 1124    PO Evaluation    Number of Days PO Intake Evaluated Insufficient Data   Recently ordered PO diet              Problem/Interventions:        Problem 1       04/25/17 1127    Nutrition Diagnoses Problem 1    Problem 1 Overweight/Obesity    Etiology (related to) Other (comment);Factors Affecting Nutrition   Excess consumption of calorie-dense foods and inactivity    Food Habit/Preferences Large Meals    Mental State/Condition Impaired Cognitive Status/Motor Control    Signs/Symptoms (evidenced by) BMI    BMI 35 - 39.9            Problem 2       04/25/17 1131    Nutrition Diagnoses Problem 2    Problem 2 Inadequate Nutrient Intake    Etiology (related to) Medical Diagnosis    Gastrointestinal Nausea;Vomiting    Neurological Encephalopathy    Signs/Symptoms (evidenced by) Other (comment)   Recently assigned PO diet after being NPO                  Intervention Goal       04/25/17 1137    Intervention Goal    General Meet nutritional needs for age/condition    PO Establish PO;Meet estimated needs;Tolerate PO    Weight No significant weight loss            Nutrition Intervention       04/25/17 1137    Nutrition Intervention    RD/Tech Action Encourage intake;Await begin PO;Follow Tx progress            Nutrition Prescription       04/25/17 1138    Other Orders    Obtain Weight Daily    Obtain Weight Ordered? No, recommended    Supplement Vitamin mineral supplement    Supplement Ordered? No, recommended    Labs Lipid Profile    Labs Ordered? No, recommended            Education/Evaluation       04/25/17 1138    Education    Education Education topics;Education not appropriate at this time    Please explain Patient confusion    Education Topics Basic nutrition;Weight management - maintain;Cardiac heart health    Monitor/Evaluation    Monitor Per protocol;I&O;PO intake;Pertinent labs;Weight         Comments:  Rec #1: Continue current diet order; encouraging intake. Rec #2: Consider offering MVI with minerals daily. RD to follow pt. Consult RD PRN.    Electronically signed by:  Eliza Monroy RD  04/25/17 11:39 AM

## 2017-04-25 NOTE — ED NOTES
Attempted straight cath per Salome PALACIOS, unsuccessful. Patient combative. MD aware.      Lorin Urbano, PHILIP  04/24/17 4938

## 2017-04-25 NOTE — ED NOTES
Spoke with Josefa at Poison Control. She states that patient should be gastric lavaged, adminster 50g of activated charcoal via NG tube. Administer 2 amps of sodium bicarb mixed in 1000ml NS bolus as well as 1 am of sodium bicarb IV push. Monitor QRS for widening, hypotension and acidosis. Obtain ABG, drug screen, alcohol, tylenol level, CBC and CMP. In the event of hypotension administer Levophed and benzos for seizures. Toxic level is at 5mg/kg, if patient truly took less than 10 pills which is what is calculated per prescription and the amount of pills missing, patient is not at a toxic level at this time.     Reported to Dr. Pope.      Lorin Urbano RN   1922, 04/24/17          Lorin Urbano RN  04/24/17 4880

## 2017-04-25 NOTE — CONSULTS
1105 to 1144    DATA: Received request for behavioral health assessment from PHILIP Leal and MD Del Angel in ICU. Patient presented to ED last night after intentional overdose of Amitriptyline, admitted to ICU due to hypersomnolence. At time of admission, patient denied overdose was an attempt to kill herself. She stated she took the medication following an argument with her mother and  in an attempt to :show them.” Met with patient at bedside. Patient reports taking “extra” medication to help her sleep following a night of arguing with her mother and . She is somewhat evasive about the nature of the argument but states that her  and mother “disagree” with the company she keeps. Patient adamantly denies any intention of harming herself when he took this medication. She denies any history of suicide attempts or self-harming behavior. Patient states that this was a “wake up call” for her and expresses remorse for what happened. She is pleasant and cooperative but anxious about “being locked up somewhere.” She is agreeable for  to make contact with her mother, Marbella Khan, and her , Walt Smith. I was able to get ahold of patient’s mother Marbella who reports that patient has a long history of manipulative behavior where she lies, will threaten to harm herself “for attention,” and sets up multiple social media pages advertising herself as single. Marbella reports that patient has never actually attempted to harm herself. She states that patient’s brother and brother’s girlfriend found patient in bed with another man. Patient’s mother confronted patient about this which led to an argument over the phone (patient’s  was at the mother’s home with his and patient’s daughter at this time). Patient became upset and defensive. She called later stating she tried to harm herself by overdosing on medication. Patient adamantly denies that she was intentionally trying to harm herself.  She states “I could never do that to my daughter…or my dogs” that she considers part of her family. Patient’s mother does not believe that patient was actually trying to harm herself and instead did this “for attention” and to deflect. She states that patient accuses of her of trying to take patient’s daughter form her and raise the daughter as her own which the mother denies. I was unable to reach patient’s  at this time.    ASSESSMENT: It appears as though patient took at least 10 Amitriptyline pills following an argument with her mother and  over their suspicions of patient being another man. Patient adamantly denies that this was an attempt to intentionally harm herself. Patient’s mother reports that patient has a pattern of manipulative behavior and believes this was an attempt to deflect negative attention regarding possible infidelity and zelaya positive attention. This appears to be consistent with patient’s initial report of wanting to “show them” when she first came into the ED. C-SSRS was administered and patient denied having a death wish, suicidal thoughts, or preparatory suicidal behavior. She does currently see a psychiatrist at [facility] in [place] and her next appoint is next month. She seems willing to see a therapist in addition to her psychiatrist and requests an apt with the [facility] behavioral health clinic. She does not appear to be in any distress except for being anxious to get home. She identifies her daughter and pets as reasons for living and appears remorseful for acting impulsively. I do not believe patient meets criteria for inpatient hospitalization or involuntary petition at this time. It is recommended that patient be referred for outpatient behavioral health treatment and follow up with her PCP and psychiatrist.    PLAN: MD Del Angel and treatment team agreeable with recommendation. Patient is also agreeable and requests appt with therapist at the [facility] behavioral  [facility]. Patient given an apt with Jeanna Can LCSW, on 7/7/17 at 12:45 PM and will be placed on the cancellation list to be notified should any sooner appts become available. Patient reports she should not have any difficulties coming to and from [place] for these appts. She also has an appt with her psychiatrist at [facility] in [place] next month. Provided resources for crisis hotlines as well as additional outpatient behavioral health resources for [facility].    TOYN Moreno

## 2017-04-25 NOTE — PROGRESS NOTES
Continued Stay Note  RAJAN Olivier     Patient Name: Jane Smith  MRN: [id number]  Today's Date: 4/25/2017    Admit Date: 4/24/2017          Discharge Plan       04/25/17 1106    Case Management/Social Work Plan    Plan ZEYNEP spoke to patient who lives with her  and 3 year old daughter who she states her mother is caring for this week. She also lives with her brother and his wife. Has no home health and no O2 at home. No equipment at home. No issues getting medications. Sees a counselor for schizophrenia at the [facility].      Patient/Family In Agreement With Plan yes    Additional Comments Nervous about speaking to behavioral health.      Final Note    Final Note Following for social and discharge needs.               Discharge Codes     None        Expected Discharge Date and Time     Expected Discharge Date Expected Discharge Time    Apr 25, 2017             Christen Magana

## 2017-04-25 NOTE — ED NOTES
Gastric lavage attempted via NG tube, 1000ml NS, approx 500ml NS returned. NG tube connected to constant high wall suction. MD aware of return.     50g activated charcoal administered through NG tube per MD order. Patient tolerated well.      Lorin Urbano RN  04/24/17 2871

## 2017-04-25 NOTE — PLAN OF CARE
Problem: Suicide Risk (Adult,Obstetrics,Pediatric)  Goal: Identify Related Risk Factors and Signs and Symptoms  Outcome: Ongoing (interventions implemented as appropriate)  Goal: Strength-Based Wellness/Recovery  Outcome: Ongoing (interventions implemented as appropriate)  Goal: Physical Safety  Outcome: Ongoing (interventions implemented as appropriate)

## 2017-04-27 LAB
BACTERIA SPEC AEROBE CULT: ABNORMAL
MRSA SPEC QL CULT: NORMAL

## 2017-12-21 ENCOUNTER — OFFICE VISIT (OUTPATIENT)
Dept: ORTHOPEDIC SURGERY | Facility: CLINIC | Age: 32
End: 2017-12-21

## 2017-12-21 VITALS — RESPIRATION RATE: 16 BRPM | BODY MASS INDEX: 44.41 KG/M2 | WEIGHT: 293 LBS | HEIGHT: 68 IN

## 2017-12-21 DIAGNOSIS — E66.01 MORBID OBESITY (HCC): ICD-10-CM

## 2017-12-21 DIAGNOSIS — S86.912A STRAIN OF LEFT KNEE AND LEG, INITIAL ENCOUNTER: ICD-10-CM

## 2017-12-21 DIAGNOSIS — M25.562 LEFT KNEE PAIN, UNSPECIFIED CHRONICITY: Primary | ICD-10-CM

## 2017-12-21 DIAGNOSIS — M94.262 CHONDROMALACIA OF LEFT KNEE: Primary | ICD-10-CM

## 2017-12-21 PROCEDURE — 99204 OFFICE O/P NEW MOD 45 MIN: CPT | Performed by: ORTHOPAEDIC SURGERY

## 2017-12-21 RX ORDER — MELOXICAM 7.5 MG/1
15 TABLET ORAL DAILY
Qty: 60 TABLET | Refills: 2 | Status: SHIPPED | OUTPATIENT
Start: 2017-12-21 | End: 2018-04-05 | Stop reason: SDUPTHER

## 2017-12-21 RX ORDER — RANITIDINE 150 MG/1
TABLET ORAL
COMMUNITY
Start: 2017-12-06 | End: 2018-06-26

## 2017-12-21 RX ORDER — SPIRONOLACTONE 50 MG/1
TABLET, FILM COATED ORAL
COMMUNITY
Start: 2017-12-06 | End: 2018-06-26

## 2017-12-21 RX ORDER — ARIPIPRAZOLE 10 MG/1
TABLET ORAL
COMMUNITY
Start: 2017-12-14 | End: 2018-03-23 | Stop reason: ALTCHOICE

## 2017-12-21 RX ORDER — AMITRIPTYLINE HYDROCHLORIDE 50 MG/1
TABLET, FILM COATED ORAL
COMMUNITY
Start: 2017-12-14

## 2017-12-21 RX ORDER — ACETAMINOPHEN AND CODEINE PHOSPHATE 300; 30 MG/1; MG/1
TABLET ORAL
COMMUNITY
Start: 2017-12-20 | End: 2018-03-23 | Stop reason: ALTCHOICE

## 2017-12-21 NOTE — PROGRESS NOTES
Subjective   Patient ID: Jane Smith is a 32 y.o. female  Pain and Consult of the Left Knee (Patient is here today to be seen today at the request of Dr. Xavi Jim. Patient denies any injury. Patient stated that about a month ago left foot began swelling, then began experiencing pain which moved up to left knee.)             History of Present Illness    Morbidly obese female with pain in the left ankle that radiates up to the knee seems to center on the anterior aspect of her left knee especially going up and down stairs, denies fall injury or acute episode of locking or giving way.    Review of Systems   Constitutional: Negative for diaphoresis, fever and unexpected weight change.   HENT: Negative for dental problem and sore throat.    Eyes: Negative for visual disturbance.   Respiratory: Negative for shortness of breath.    Cardiovascular: Negative for chest pain.   Gastrointestinal: Negative for abdominal pain, constipation, diarrhea, nausea and vomiting.   Genitourinary: Negative for difficulty urinating and frequency.   Musculoskeletal: Positive for arthralgias.   Neurological: Negative for headaches.   Hematological: Does not bruise/bleed easily.   All other systems reviewed and are negative.      Past Medical History:   Diagnosis Date   • Mental health disorder     multiple unknown possible bipolar, schitzoprenic        History reviewed. No pertinent surgical history.    History reviewed. No pertinent family history.    Social History     Social History   • Marital status:      Spouse name: N/A   • Number of children: N/A   • Years of education: N/A     Occupational History   • Not on file.     Social History Main Topics   • Smoking status: Never Smoker   • Smokeless tobacco: Never Used   • Alcohol use No   • Drug use: No   • Sexual activity: Defer     Other Topics Concern   • Not on file     Social History Narrative       I have reviewed all of the above social hx, family hx, surgical  "hx, medications, allergies & ROS and confirm that it is accurate.    No Known Allergies    Objective   Resp 16  Ht 172.7 cm (67.99\")  Wt (!) 185 kg (407 lb 12.8 oz)  BMI 62.02 kg/m2   Physical Exam  Constitutional: Patient is oriented to person, place, and time. Patient appears well-developed and well-nourished.   HENT:Head: Normocephalic and atraumatic.   Eyes: EOM are normal. Pupils are equal, round, and reactive to light.   Neck: Normal range of motion. Neck supple.   Cardiovascular: Normal rate.    Pulmonary/Chest: Effort normal and breath sounds normal.   Abdominal: Soft.   Neurological: Patient is alert and oriented to person, place, and time.   Skin: Skin is warm and dry.   Psychiatric: Patient has a normal mood and affect.   Nursing note and vitals reviewed.       Ortho Exam   Left knee with tenderness at the patellar tendon origin at the inferior pole patella full extension no effusion minimal medial joint line tenderness with Larissa sign, negative Lockman sign no posterior lateral joint line tenderness left lower leg without edema calf supple Homans sign negative    Assessment/Plan   Review of Radiographic Studies:    Radiographic images today of affected area I personally viewed and showed no sign of acute fracture or dislocation.      Procedures     Jane was seen today for pain and consult.    Diagnoses and all orders for this visit:    Chondromalacia of left knee  -     Ambulatory Referral to Physical Therapy Evaluate and treat    Strain of left knee and leg, initial encounter    Morbid obesity    Other orders  -     meloxicam (MOBIC) 7.5 MG tablet; Take 2 tablets by mouth Daily.     Physical therapy referral given      Recommendations/Plan:   Referral: Bariatric surgery    Patient agreeable to call or return sooner for any concerns.             Impression:  Morbid obesity, left anterior knee pain patellar tendinitis with chondromalacia  Plan:  Therapy, recheck 1 month if not improved order MRI " at that time, refer to bariatric surgery for weight loss

## 2018-02-05 ENCOUNTER — DOCUMENTATION (OUTPATIENT)
Dept: BARIATRICS/WEIGHT MGMT | Facility: CLINIC | Age: 33
End: 2018-02-05

## 2018-03-07 DIAGNOSIS — R53.83 FATIGUE, UNSPECIFIED TYPE: ICD-10-CM

## 2018-03-07 DIAGNOSIS — R06.00 DYSPNEA, UNSPECIFIED TYPE: Primary | ICD-10-CM

## 2018-03-07 DIAGNOSIS — R10.13 DYSPEPSIA: ICD-10-CM

## 2018-03-15 ENCOUNTER — TELEPHONE (OUTPATIENT)
Dept: SURGERY | Facility: CLINIC | Age: 33
End: 2018-03-15

## 2018-03-15 NOTE — TELEPHONE ENCOUNTER
Patient no showed for appointment . Called patient left voice message. Patient  called and rescheduled patient appointment.

## 2018-03-16 ENCOUNTER — OFFICE VISIT (OUTPATIENT)
Dept: SURGERY | Facility: CLINIC | Age: 33
End: 2018-03-16

## 2018-03-16 VITALS
SYSTOLIC BLOOD PRESSURE: 132 MMHG | BODY MASS INDEX: 45.99 KG/M2 | WEIGHT: 293 LBS | TEMPERATURE: 97.4 F | HEART RATE: 111 BPM | HEIGHT: 67 IN | OXYGEN SATURATION: 98 % | DIASTOLIC BLOOD PRESSURE: 88 MMHG

## 2018-03-16 DIAGNOSIS — K60.2 ANAL FISSURE: Primary | ICD-10-CM

## 2018-03-16 DIAGNOSIS — E66.9 SUPER OBESE: ICD-10-CM

## 2018-03-16 PROCEDURE — 99243 OFF/OP CNSLTJ NEW/EST LOW 30: CPT | Performed by: SURGERY

## 2018-03-16 RX ORDER — BUSPIRONE HYDROCHLORIDE 15 MG/1
TABLET ORAL
COMMUNITY
Start: 2018-01-26

## 2018-03-16 NOTE — PROGRESS NOTES
"Patient: Jane Smith    YOB: 1985    Date: 03/16/2018    Primary Care Provider: BIB Chilel    Chief Complaint   Patient presents with   • Hemorrhoids     Evaluation of hemorrhoids        Subjective .     History of present illness:  The patient is in the office today for evaluation of a small hemorrhoid. She says that after a bowel movement she has rectal pain and sometimes bleeding.  She says the symptoms started 2 weeks ago but the pain is getting worse.  She says her stool is  \"sticky\" And denies constipation or straining.  She says that she has a cyst on a hemorrhoid that was found on exam.  She is getting ready to have a gastric sleeve.    The following portions of the patient's history were reviewed and updated as appropriate: allergies, current medications, past family history, past medical history, past social history, past surgical history and problem list.      Review of Systems   Constitutional: Negative for chills, fever and unexpected weight change.   HENT: Negative for hearing loss, trouble swallowing and voice change.    Eyes: Negative for visual disturbance.   Respiratory: Negative for apnea, cough, chest tightness, shortness of breath and wheezing.    Cardiovascular: Negative for chest pain, palpitations and leg swelling.   Gastrointestinal: Positive for anal bleeding and rectal pain. Negative for abdominal distention, abdominal pain, blood in stool, constipation, diarrhea, nausea and vomiting.   Endocrine: Negative for cold intolerance and heat intolerance.   Genitourinary: Negative for difficulty urinating, dysuria and flank pain.   Musculoskeletal: Negative for back pain and gait problem.   Skin: Negative for color change, rash and wound.   Neurological: Negative for dizziness, syncope, speech difficulty, weakness, light-headedness, numbness and headaches.   Hematological: Negative for adenopathy. Does not bruise/bleed easily.   Psychiatric/Behavioral: Negative " "for confusion. The patient is not nervous/anxious.        Allergies:  Allergies   Allergen Reactions   • Ciprofloxacin Nausea And Vomiting       Medications:    Current Outpatient Prescriptions:   •  amitriptyline (ELAVIL) 50 MG tablet, , Disp: , Rfl:   •  ARIPiprazole (ABILIFY) 10 MG tablet, , Disp: , Rfl:   •  busPIRone (BUSPAR) 15 MG tablet, , Disp: , Rfl:   •  DULoxetine (CYMBALTA) 60 MG capsule, Take 120 mg by mouth Daily., Disp: , Rfl:   •  famotidine (PEPCID) 20 MG tablet, Take 20 mg by mouth 2 (Two) Times a Day., Disp: , Rfl:   •  hydrochlorothiazide (HYDRODIURIL) 25 MG tablet, Take 25 mg by mouth Daily., Disp: , Rfl:   •  lamoTRIgine (LaMICtal) 100 MG tablet, Take 300 mg by mouth Daily., Disp: , Rfl:   •  lisinopril (PRINIVIL,ZESTRIL) 5 MG tablet, Take 5 mg by mouth Daily., Disp: , Rfl:   •  melatonin 5 MG tablet tablet, Take 10 mg by mouth At Night As Needed., Disp: , Rfl:   •  meloxicam (MOBIC) 7.5 MG tablet, Take 2 tablets by mouth Daily., Disp: 60 tablet, Rfl: 2  •  mupirocin (BACTROBAN) 2 % ointment, , Disp: , Rfl:   •  omeprazole (priLOSEC) 40 MG capsule, Take 40 mg by mouth Daily., Disp: , Rfl:   •  raNITIdine (ZANTAC) 150 MG tablet, , Disp: , Rfl:   •  simvastatin (ZOCOR) 10 MG tablet, Take 10 mg by mouth Every Night., Disp: , Rfl:   •  acetaminophen-codeine (TYLENOL #3) 300-30 MG per tablet, , Disp: , Rfl:   •  cefdinir (OMNICEF) 300 MG capsule, Take 1 capsule by mouth 2 (Two) Times a Day., Disp: 10 capsule, Rfl: 0  •  PROCTOSOL HC 2.5 % rectal cream, , Disp: , Rfl:   •  spironolactone (ALDACTONE) 50 MG tablet, , Disp: , Rfl:     History\"  Past Medical History:   Diagnosis Date   • Chiari malformation type I    • Mental health disorder     multiple unknown possible bipolar, schitzoprenic       Past Surgical History:   Procedure Laterality Date   • BRAIN SURGERY     • CHOLECYSTECTOMY     • REDUCTION MAMMAPLASTY         Family History   Problem Relation Age of Onset   • Hypertension Mother    • " "Hyperlipidemia Mother    • Hypertension Father    • Diabetes Father    • Hyperlipidemia Father        Social History   Substance Use Topics   • Smoking status: Never Smoker   • Smokeless tobacco: Never Used   • Alcohol use No        Objective     Vital Signs:   /88   Pulse 111   Temp 97.4 °F (36.3 °C) (Temporal Artery )   Ht 170.2 cm (67\")   Wt (!) 198 kg (436 lb 12.8 oz)   SpO2 98%   BMI 68.41 kg/m²     Physical Exam:   General Appearance:    Alert, cooperative, in no acute distress   Head:    Normocephalic, without obvious abnormality, atraumatic   Eyes:            Lids and lashes normal, conjunctivae and sclerae normal, no   icterus, no pallor, corneas clear, PERRLA   Ears:    Ears appear intact with no abnormalities noted   Lungs:     Clear to auscultation,respirations regular, even and                  unlabored    Heart:    Regular rhythm and normal rate, normal S1 and S2, no            murmur, no gallop, no rub, no click   Chest Wall:    No abnormalities observed   Abdomen:     Normal bowel sounds, no masses, no organomegaly, soft        non-tender, non-distended, no guarding, no rebound                Tenderness. obese    Extremities:   Moves all extremities well, no edema, no cyanosis, no             redness   Skin:   No bleeding, bruising or rash   Neurologic:   Cranial nerves 2 - 12 grossly intact, sensation intact,   Anal exam: Exam was difficult due to her obesity and pain.  She did have anal pain and there was evidence of an anterior midline fissure.       Results Review:   None    Assessment/Plan     1. Anal fissure    2. Super obese      No history of constipation.  High-fiber diet or fiber supplement if needed.  I will order nifedipine topically.  Follow-up next week.      Electronically signed by Rohan Alva MD  03/16/18      Scribed for Rohan Alva MD by Michelle Milligan. 3/16/2018  10:26 AM                "

## 2018-03-23 ENCOUNTER — OFFICE VISIT (OUTPATIENT)
Dept: SURGERY | Facility: CLINIC | Age: 33
End: 2018-03-23

## 2018-03-23 VITALS
RESPIRATION RATE: 18 BRPM | TEMPERATURE: 97.8 F | HEIGHT: 67 IN | WEIGHT: 293 LBS | DIASTOLIC BLOOD PRESSURE: 80 MMHG | BODY MASS INDEX: 45.99 KG/M2 | OXYGEN SATURATION: 97 % | SYSTOLIC BLOOD PRESSURE: 126 MMHG | HEART RATE: 118 BPM

## 2018-03-23 DIAGNOSIS — K60.2 ANAL FISSURE: Primary | ICD-10-CM

## 2018-03-23 PROCEDURE — 99212 OFFICE O/P EST SF 10 MIN: CPT | Performed by: SURGERY

## 2018-03-23 NOTE — PROGRESS NOTES
"Patient: Jane Smith    YOB: 1985    Date: 03/23/2018    Primary Care Provider: BIB Chilel    Chief Complaint   Patient presents with   • Follow-up     anal fissure.       History: Pt is here for follow-up anal fissure, she stated that she is still having pain in her rectum with bouts of bright red rectal bleeding occurring both after bowel movements and spontaneously as well.  She also stated that she could not use Nifedipine ointment because it caused her pain to increase.    The following portions of the patient's history were reviewed and updated as appropriate: allergies, current medications, past family history, past medical history, past social history, past surgical history and problem list.      Review of Systems   Constitutional: Negative for chills, fever and unexpected weight change.   HENT: Negative for hearing loss, trouble swallowing and voice change.    Eyes: Negative for visual disturbance.   Respiratory: Negative for apnea, cough, chest tightness, shortness of breath and wheezing.    Cardiovascular: Negative for chest pain, palpitations and leg swelling.   Gastrointestinal: Positive for anal bleeding, blood in stool and rectal pain. Negative for abdominal distention, abdominal pain, constipation, diarrhea, nausea and vomiting.   Endocrine: Negative for cold intolerance and heat intolerance.   Genitourinary: Negative for difficulty urinating, dysuria and flank pain.   Musculoskeletal: Negative for back pain and gait problem.   Skin: Negative for color change, rash and wound.   Neurological: Negative for dizziness, syncope, speech difficulty, weakness, light-headedness, numbness and headaches.   Hematological: Negative for adenopathy. Does not bruise/bleed easily.   Psychiatric/Behavioral: Negative for confusion. The patient is not nervous/anxious.        Vital Signs  /80   Pulse 118   Temp 97.8 °F (36.6 °C) (Temporal Artery )   Resp 18   Ht 170.2 cm (67\")  "  Wt (!) 195 kg (430 lb)   SpO2 97%   BMI 67.35 kg/m²     Allergies:  Allergies   Allergen Reactions   • Ciprofloxacin Nausea And Vomiting       Medications:    Current Outpatient Prescriptions:   •  amitriptyline (ELAVIL) 50 MG tablet, , Disp: , Rfl:   •  busPIRone (BUSPAR) 15 MG tablet, , Disp: , Rfl:   •  DULoxetine (CYMBALTA) 60 MG capsule, Take 120 mg by mouth Daily., Disp: , Rfl:   •  famotidine (PEPCID) 20 MG tablet, Take 20 mg by mouth 2 (Two) Times a Day., Disp: , Rfl:   •  hydrochlorothiazide (HYDRODIURIL) 25 MG tablet, Take 25 mg by mouth Daily., Disp: , Rfl:   •  lamoTRIgine (LaMICtal) 100 MG tablet, Take 300 mg by mouth Daily., Disp: , Rfl:   •  lisinopril (PRINIVIL,ZESTRIL) 5 MG tablet, Take 5 mg by mouth Daily., Disp: , Rfl:   •  melatonin 5 MG tablet tablet, Take 10 mg by mouth At Night As Needed., Disp: , Rfl:   •  meloxicam (MOBIC) 7.5 MG tablet, Take 2 tablets by mouth Daily., Disp: 60 tablet, Rfl: 2  •  mupirocin (BACTROBAN) 2 % ointment, , Disp: , Rfl:   •  omeprazole (priLOSEC) 40 MG capsule, Take 40 mg by mouth Daily., Disp: , Rfl:   •  raNITIdine (ZANTAC) 150 MG tablet, , Disp: , Rfl:   •  simvastatin (ZOCOR) 10 MG tablet, Take 10 mg by mouth Every Night., Disp: , Rfl:   •  spironolactone (ALDACTONE) 50 MG tablet, , Disp: , Rfl:     Physical Exam:   General Appearance:    Alert, cooperative, in no acute distress. Patient refused exam today      Results Review:   None     Assessment/Plan     1. Anal fissure      Presumed and there was suggestion on exam of the fissure.  I think the patient likely will require exam under anesthesia and treatment as necessary.  She is at  higher risk and more difficult to evaluate with her BMI of 67 and I will therefore refer her to colorectal.  Patient understands this.    Electronically signed by Rohan Alva MD  03/23/18    Scribed for Rohan Alva MD by Taisha Diamond. 3/23/2018  11:14 AM

## 2018-04-02 DIAGNOSIS — R53.83 FATIGUE, UNSPECIFIED TYPE: ICD-10-CM

## 2018-04-02 DIAGNOSIS — R06.00 DYSPNEA, UNSPECIFIED TYPE: ICD-10-CM

## 2018-04-02 DIAGNOSIS — R10.13 DYSPEPSIA: ICD-10-CM

## 2018-04-05 RX ORDER — MELOXICAM 15 MG/1
TABLET ORAL
Qty: 30 TABLET | Refills: 0 | Status: SHIPPED | OUTPATIENT
Start: 2018-04-05 | End: 2018-06-26

## 2018-05-18 ENCOUNTER — OFFICE VISIT (OUTPATIENT)
Dept: ORTHOPEDIC SURGERY | Facility: CLINIC | Age: 33
End: 2018-05-18

## 2018-05-18 ENCOUNTER — PRIOR AUTHORIZATION (OUTPATIENT)
Dept: ORTHOPEDIC SURGERY | Facility: CLINIC | Age: 33
End: 2018-05-18

## 2018-05-18 VITALS — WEIGHT: 293 LBS | HEIGHT: 67 IN | BODY MASS INDEX: 45.99 KG/M2 | RESPIRATION RATE: 18 BRPM

## 2018-05-18 DIAGNOSIS — E66.01 MORBID OBESITY (HCC): Primary | ICD-10-CM

## 2018-05-18 DIAGNOSIS — M25.562 LEFT KNEE PAIN, UNSPECIFIED CHRONICITY: ICD-10-CM

## 2018-05-18 DIAGNOSIS — M25.562 LEFT KNEE PAIN, UNSPECIFIED CHRONICITY: Primary | ICD-10-CM

## 2018-05-18 PROCEDURE — 99213 OFFICE O/P EST LOW 20 MIN: CPT | Performed by: ORTHOPAEDIC SURGERY

## 2018-05-18 RX ORDER — MELOXICAM 7.5 MG/1
7.5 TABLET ORAL DAILY
Qty: 60 TABLET | Refills: 0 | Status: SHIPPED | OUTPATIENT
Start: 2018-05-18 | End: 2018-06-26

## 2018-05-18 RX ORDER — ACETAMINOPHEN AND CODEINE PHOSPHATE 300; 30 MG/1; MG/1
TABLET ORAL
COMMUNITY
Start: 2018-05-12 | End: 2018-06-26

## 2018-05-18 NOTE — PROGRESS NOTES
Subjective   Patient ID: Jane Smith is a 32 y.o. female  Follow-up and Pain of the Left Knee (Patient states her left knee pain has increasingly gotten worse. 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    Review of Systems   Constitutional: Negative for fever.   HENT: Negative for voice change.    Eyes: Negative for visual disturbance.   Respiratory: Negative for shortness of breath.    Cardiovascular: Negative for chest pain.   Gastrointestinal: Negative for abdominal distention and abdominal pain.   Genitourinary: Negative for dysuria.   Musculoskeletal: Positive for arthralgias. Negative for gait problem and joint swelling.   Skin: Negative for rash.   Neurological: Negative for speech difficulty.   Hematological: Does not bruise/bleed easily.   Psychiatric/Behavioral: Negative for confusion.       Past Medical History:   Diagnosis Date   • Chiari malformation type I    • Mental health disorder     multiple unknown possible bipolar, schitzoprenic        Past Surgical History:   Procedure Laterality Date   • BRAIN SURGERY     • CHOLECYSTECTOMY     • REDUCTION MAMMAPLASTY         Family History   Problem Relation Age of Onset   • Hypertension Mother    • Hyperlipidemia Mother    • Hypertension Father    • Diabetes Father    • Hyperlipidemia Father        Social History     Social History   • Marital status:      Spouse name: N/A   • Number of children: N/A   • Years of education: N/A     Occupational History   • Not on file.     Social History Main Topics   • Smoking  "status: Never Smoker   • Smokeless tobacco: Never Used   • Alcohol use No   • Drug use: No   • Sexual activity: Defer     Other Topics Concern   • Not on file     Social History Narrative   • No narrative on file       I have reviewed all of the above social hx, family hx, surgical hx, medications, allergies & ROS and confirm that it is accurate.    Allergies   Allergen Reactions   • Ciprofloxacin Nausea And Vomiting         Current Outpatient Prescriptions:   •  acetaminophen-codeine (TYLENOL #3) 300-30 MG per tablet, , Disp: , Rfl:   •  amitriptyline (ELAVIL) 50 MG tablet, , Disp: , Rfl:   •  busPIRone (BUSPAR) 15 MG tablet, , Disp: , Rfl:   •  diclofenac (VOLTAREN) 1 % gel gel, Apply 4 g topically 4 (Four) Times a Day., Disp: 100 g, Rfl: 1  •  DULoxetine (CYMBALTA) 60 MG capsule, Take 120 mg by mouth Daily., Disp: , Rfl:   •  famotidine (PEPCID) 20 MG tablet, Take 20 mg by mouth 2 (Two) Times a Day., Disp: , Rfl:   •  hydrochlorothiazide (HYDRODIURIL) 25 MG tablet, Take 25 mg by mouth Daily., Disp: , Rfl:   •  lamoTRIgine (LaMICtal) 100 MG tablet, Take 300 mg by mouth Daily., Disp: , Rfl:   •  lisinopril (PRINIVIL,ZESTRIL) 5 MG tablet, Take 5 mg by mouth Daily., Disp: , Rfl:   •  melatonin 5 MG tablet tablet, Take 10 mg by mouth At Night As Needed., Disp: , Rfl:   •  meloxicam (MOBIC) 15 MG tablet, TAKE ONE TABLET BY MOUTH DAILY, Disp: 30 tablet, Rfl: 0  •  meloxicam (MOBIC) 7.5 MG tablet, Take 1 tablet by mouth Daily., Disp: 60 tablet, Rfl: 0  •  mupirocin (BACTROBAN) 2 % ointment, , Disp: , Rfl:   •  omeprazole (priLOSEC) 40 MG capsule, Take 40 mg by mouth Daily., Disp: , Rfl:   •  raNITIdine (ZANTAC) 150 MG tablet, , Disp: , Rfl:   •  simvastatin (ZOCOR) 10 MG tablet, Take 10 mg by mouth Every Night., Disp: , Rfl:   •  spironolactone (ALDACTONE) 50 MG tablet, , Disp: , Rfl:     Objective   Resp 18   Ht 170.2 cm (67\")   Wt (!) 195 kg (430 lb)   BMI 67.35 kg/m²    Physical Exam  Constitutional: Patient is " oriented to person, place, and time. Patient appears well-developed and well-nourished.   HENT:Head: Normocephalic and atraumatic.   Eyes: EOM are normal. Pupils are equal, round, and reactive to light.   Neck: Normal range of motion. Neck supple.   Cardiovascular: Normal rate.    Pulmonary/Chest: Effort normal and breath sounds normal.   Abdominal: Soft.   Neurological: Patient is alert and oriented to person, place, and time.   Skin: Skin is warm and dry.   Psychiatric: Patient has a normal mood and affect.   Nursing note and vitals reviewed.       Ortho Exam   Left knee with slight valgus alignment full active extension no erythema minimal warmth slight effusion some patellofemoral crepitus with pain flexion limited to 80° some posterior lateral tenderness calf supple Homans sign negative no edema in the ankle multiple varicosities    Assessment/Plan   Review of Radiographic Studies:    No new imaging done today.      Procedures     Jane was seen today for follow-up and pain.    Diagnoses and all orders for this visit:    Morbid obesity    Left knee pain, unspecified chronicity  -     MRI Knee Left Without Contrast; Future    Other orders  -     diclofenac (VOLTAREN) 1 % gel gel; Apply 4 g topically 4 (Four) Times a Day.  -     meloxicam (MOBIC) 7.5 MG tablet; Take 1 tablet by mouth Daily.       Orthopedic activities reviewed and patient expressed appreciation and Risk, benefits, and merits of treatment alternatives reviewed with the patient and questions answered      Recommendations/Plan:   Work/Activity Status: May perform usual activities as tolerated    Patient agreeable to call or return sooner for any concerns.             Impression:  BMI 67 left anterior knee pain probable chondral malacia patella  Plan:  Mobic, MRI, topical Voltaren gel, return after MRI completed

## 2018-06-18 ENCOUNTER — DOCUMENTATION (OUTPATIENT)
Dept: BARIATRICS/WEIGHT MGMT | Facility: CLINIC | Age: 33
End: 2018-06-18

## 2018-06-26 ENCOUNTER — OFFICE VISIT (OUTPATIENT)
Dept: PSYCHIATRY | Facility: CLINIC | Age: 33
End: 2018-06-26

## 2018-06-26 ENCOUNTER — OFFICE VISIT (OUTPATIENT)
Dept: BARIATRICS/WEIGHT MGMT | Facility: CLINIC | Age: 33
End: 2018-06-26

## 2018-06-26 VITALS
DIASTOLIC BLOOD PRESSURE: 88 MMHG | BODY MASS INDEX: 45.99 KG/M2 | SYSTOLIC BLOOD PRESSURE: 122 MMHG | TEMPERATURE: 97.8 F | WEIGHT: 293 LBS | OXYGEN SATURATION: 99 % | HEART RATE: 108 BPM | HEIGHT: 67 IN | RESPIRATION RATE: 18 BRPM

## 2018-06-26 DIAGNOSIS — E66.01 MORBID OBESITY WITH BMI OF 60.0-69.9, ADULT (HCC): ICD-10-CM

## 2018-06-26 DIAGNOSIS — R10.13 DYSPEPSIA: Primary | ICD-10-CM

## 2018-06-26 DIAGNOSIS — F99 MENTAL HEALTH DISORDER: ICD-10-CM

## 2018-06-26 DIAGNOSIS — F31.81 BIPOLAR II DISORDER (HCC): Primary | ICD-10-CM

## 2018-06-26 DIAGNOSIS — Z86.59 H/O PSYCHOSIS: ICD-10-CM

## 2018-06-26 DIAGNOSIS — R53.83 FATIGUE, UNSPECIFIED TYPE: ICD-10-CM

## 2018-06-26 DIAGNOSIS — E66.01 MORBID OBESITY (HCC): ICD-10-CM

## 2018-06-26 DIAGNOSIS — T14.91XA SUICIDE ATTEMPT (HCC): ICD-10-CM

## 2018-06-26 LAB
ALBUMIN SERPL-MCNC: 4.34 G/DL (ref 3.2–4.8)
ALBUMIN/GLOB SERPL: 1.6 G/DL (ref 1.5–2.5)
ALP SERPL-CCNC: 119 U/L (ref 25–100)
ALT SERPL-CCNC: 33 U/L (ref 7–40)
AST SERPL-CCNC: 29 U/L (ref 0–33)
BILIRUB SERPL-MCNC: 0.3 MG/DL (ref 0.3–1.2)
BUN SERPL-MCNC: 10 MG/DL (ref 9–23)
BUN/CREAT SERPL: 9.2 (ref 7–25)
CALCIUM SERPL-MCNC: 9.2 MG/DL (ref 8.7–10.4)
CHLORIDE SERPL-SCNC: 105 MMOL/L (ref 99–109)
CHOLEST SERPL-MCNC: 183 MG/DL (ref 0–200)
CO2 SERPL-SCNC: 29 MMOL/L (ref 20–31)
CREAT SERPL-MCNC: 1.09 MG/DL (ref 0.6–1.3)
ERYTHROCYTE [DISTWIDTH] IN BLOOD BY AUTOMATED COUNT: 15.8 % (ref 11.3–14.5)
GLOBULIN SER CALC-MCNC: 2.7 GM/DL
GLUCOSE SERPL-MCNC: 107 MG/DL (ref 70–100)
HCT VFR BLD AUTO: 42.9 % (ref 34.5–44)
HDLC SERPL-MCNC: 55 MG/DL (ref 40–60)
HGB BLD-MCNC: 13.5 G/DL (ref 11.5–15.5)
LDLC SERPL CALC-MCNC: 112 MG/DL (ref 0–100)
MCH RBC QN AUTO: 28.3 PG (ref 27–31)
MCHC RBC AUTO-ENTMCNC: 31.5 G/DL (ref 32–36)
MCV RBC AUTO: 89.9 FL (ref 80–99)
PLATELET # BLD AUTO: 361 10*3/MM3 (ref 150–450)
POTASSIUM SERPL-SCNC: 4.8 MMOL/L (ref 3.5–5.5)
PROT SERPL-MCNC: 7 G/DL (ref 5.7–8.2)
RBC # BLD AUTO: 4.77 10*6/MM3 (ref 3.89–5.14)
SODIUM SERPL-SCNC: 141 MMOL/L (ref 132–146)
TRIGL SERPL-MCNC: 79 MG/DL (ref 0–150)
TSH SERPL DL<=0.005 MIU/L-ACNC: 2.79 MIU/ML (ref 0.35–5.35)
VLDLC SERPL CALC-MCNC: 15.8 MG/DL
WBC # BLD AUTO: 10.36 10*3/MM3 (ref 3.5–10.8)

## 2018-06-26 PROCEDURE — 90791 PSYCH DIAGNOSTIC EVALUATION: CPT | Performed by: PSYCHOLOGIST

## 2018-06-26 PROCEDURE — 99204 OFFICE O/P NEW MOD 45 MIN: CPT | Performed by: PHYSICIAN ASSISTANT

## 2018-06-26 RX ORDER — DULOXETIN HYDROCHLORIDE 60 MG/1
60 CAPSULE, DELAYED RELEASE ORAL DAILY
COMMUNITY

## 2018-06-26 RX ORDER — LAMOTRIGINE 100 MG/1
100 TABLET ORAL DAILY
COMMUNITY

## 2018-06-26 NOTE — PROGRESS NOTES
"[facility] BARIATRIC SURGERY  [address]  [facility] [address]  [phone]      Patient  Name:  Jane Smith  :  1985      Date of Visit: 2018      Chief Complaint:  weight gain; unable to maintain weight loss    History of Present Illness:  Jane Smith is a 32 y.o. female who presents today for evaluation, education and consultation regarding weight loss surgery. The patient is interested in sleeve gastrectomy.     Jane has been overweight for at least 15 years, has been 35 pounds or more overweight for at least 14 years, has been 100 pounds or more overweight for 3 or more years.  The most weight Jane lost was 100 pounds w/ a starvation diet but was only able to maintain that weight loss for 1 year.  Her maximum lifetime weight is 446 pounds - current weight.    All past medical, surgical, social and family history have been obtained and discussed today, as pertinent to bariatric surgery.     Past Medical History:   Diagnosis Date   • Chiari malformation type I    • Dyspepsia    • Dyspnea on exertion    • Fatigue    • Insomnia    • Mental health disorder     \"schizo bipolar\"   • Morbid obesity    • Suicide attempt     2017 - hospitalized      Past Surgical History:   Procedure Laterality Date   • CERVICAL CHIARI DECOMPRESSION POSTERIOR      complicated by spinal leak   •  SECTION     • LAPAROSCOPIC CHOLECYSTECTOMY      for gangrene, no stones   • REDUCTION MAMMAPLASTY         Allergies   Allergen Reactions   • Ciprofloxacin Nausea And Vomiting   • Aspirin GI Intolerance       Current Outpatient Prescriptions:   •  amitriptyline (ELAVIL) 50 MG tablet, , Disp: , Rfl:   •  busPIRone (BUSPAR) 15 MG tablet, , Disp: , Rfl:   •  DULoxetine (CYMBALTA) 60 MG capsule, Take 60 mg by mouth Daily., Disp: , Rfl:   •  lamoTRIgine (LaMICtal) 100 MG tablet, Take 100 mg by mouth Daily., Disp: , Rfl:   •  melatonin 5 MG tablet " "tablet, Take 10 mg by mouth At Night As Needed., Disp: , Rfl:     Social History     Social History   • Marital status:      Spouse name: N/A   • Number of children: N/A   • Years of education: N/A     Occupational History   • Not on file.     Social History Main Topics   • Smoking status: Never Smoker   • Smokeless tobacco: Never Used      Comment:  smokes outside   • Alcohol use No   • Drug use: No   • Sexual activity: Defer     Other Topics Concern   • Not on file     Social History Narrative    Lives in [place], [place] w/  and 3 y/o daughter.  Stay-at-home mom.       Family History   Problem Relation Age of Onset   • Hypertension Mother    • Hyperlipidemia Mother    • Hypertension Father    • Diabetes Father    • Hyperlipidemia Father        Review of Systems:  Constitutional:  reports fatigue, weight gain and denies fevers, chills.  HEENT:  denies ear pain or loss of hearing, blurred or double vision, nasal discharge or sore throat.  Cardiovascular:  reports \"irregular heartbeat\" and denies chest pain, palpitations, hx DVT.  Respiratory:  denies cough , wheezing, sleep apnea, asthma, hx PE.  Gastrointestinal:  denies dysphagia, heartburn, nausea, vomiting, abdominal pain, IBS, liver disease.  Genitourinary:  reports history of  frequent UTI and denies hematuria, dysuria, renal insufficiency.    Musculoskeletal:  reports joint pain, back pain and denies fibromyalgia and autoimmune disease.  Neurological:  reports memory issues since chiari surgery and denies migraines, numbness /tingling, dizziness, confusion, seizure, stroke.  Psychiatric:  reports schizo bipolar and hx suicide attempt  Endocrine:  denies glucose intolerance, diabetes, thyroid disease.  Hematologic:  denies bleeding disorder, hx blood transfusion.  Skin:  denies rashes, hx MRSA.    Physical Exam:  Vital Signs:  Weight: (!) 202 kg (446 lb)   Body mass index is 69.85 kg/m².  Temp: 97.8 °F (36.6 °C)   Heart Rate: 108   BP: 122/88 "     Physical Exam   Constitutional: She is oriented to person, place, and time. She appears well-developed and well-nourished.   HENT:   Head: Normocephalic and atraumatic.   no upper teeth, poor dentition lower teeth   Eyes: Conjunctivae are normal. No scleral icterus.   Neck: Neck supple. No thyromegaly present.   Cardiovascular: Normal rate and regular rhythm.    No murmur heard.  Pulmonary/Chest: Effort normal and breath sounds normal. No respiratory distress. She has no wheezes. She has no rales.   Abdominal: Soft. Bowel sounds are normal. She exhibits no distension and no mass. There is no tenderness. No hernia.   scars: lap melissa and pfannenstiel, diastasis noted   Musculoskeletal: Normal range of motion. She exhibits no edema.   Neurological: She is alert and oriented to person, place, and time. Gait normal.   Skin: Skin is warm and dry. No rash noted.   large tattoo on upper back   Psychiatric: She has a normal mood and affect. Judgment normal.   Vitals reviewed.      Patient Active Problem List   Diagnosis   • TCA (tricyclic antidepressant) overdose of undetermined intent   • Mental health disorder   • Morbid obesity   • Fatigue   • Dyspepsia   • Dyspnea on exertion   • Suicide attempt   • Chiari malformation type I   • Insomnia       Assessment:    Jane Smith is a 32 y.o. female with medically complicated obesity pursuing sleeve gastrectomy.    Weight loss surgery is deemed medically necessary given current Weight: (!) 202 kg (446 lb) and Body mass index is 69.85 kg/m².    Plan:  The consultation plan and program requirements were reviewed with the patient.  The patient has been advised that a letter of medical support must be obtained from her primary care physician or referring provider. A psychological evaluation will be arranged.  A nutritional evaluation will be performed.  The patient was advised to start a high protein and low carbohydrate diet.  Necessary lifestyle modifications were  discussed.  Instructions on how to access RADHA was given to the patient.  RADHA is an internet based educational video that explains the surgical procedure chosen and answers basic questions regarding that procedure.     Preoperative testing will include: CBC, CMP, Lipids, TSH, H.Pylori, CXR and EKG.    EGD will be required per insurance.    Additional preop clearances required prior to surgery: Cardiac.      The patient has been educated on expected postoperative lifestyle changes, including commitment to high protein diet, vitamin regimen, and exercise program.  They are aware that support groups are encouraged for optimal weight loss results. Patient understands that bariatric surgery is not cosmetic surgery but rather a tool to help make a lifelong commitment to lifestyle changes including diet, exercise, behavior modifications, and healthy habits. The procedure was discussed with the patient and all questions were answered. The importance of avoiding ASA/ NSAIDS/ steroids/ tobacco/ hormones/ immunomodulators perioperatively was discussed.       BIB Cedeno

## 2018-06-27 ENCOUNTER — DOCUMENTATION (OUTPATIENT)
Dept: BARIATRICS/WEIGHT MGMT | Facility: CLINIC | Age: 33
End: 2018-06-27

## 2018-06-27 LAB — UREA BREATH TEST QL: NEGATIVE

## 2018-06-27 NOTE — PROGRESS NOTES
"Weight Loss Surgery  Presurgical Nutrition Assessment     Jane Smith  2018  [id number]  [id number]  1985  female    Surgery desired: Sleeve Gastrectomy    Weight: (!) 202 kg (446 lb)   Body mass index is 69.85 kg/m².  Past Medical History:   Diagnosis Date   • Chiari malformation type I    • Dyspepsia    • Dyspnea on exertion    • Fatigue    • Insomnia    • Mental health disorder     \"schizo bipolar\"   • Morbid obesity    • Suicide attempt     2017 - hospitalized      Past Surgical History:   Procedure Laterality Date   • CERVICAL CHIARI DECOMPRESSION POSTERIOR      complicated by spinal leak   •  SECTION     • LAPAROSCOPIC CHOLECYSTECTOMY      for gangrene, no stones   • REDUCTION MAMMAPLASTY       Allergies   Allergen Reactions   • Ciprofloxacin Nausea And Vomiting   • Aspirin GI Intolerance       Current Outpatient Prescriptions:   •  amitriptyline (ELAVIL) 50 MG tablet, , Disp: , Rfl:   •  busPIRone (BUSPAR) 15 MG tablet, , Disp: , Rfl:   •  DULoxetine (CYMBALTA) 60 MG capsule, Take 60 mg by mouth Daily., Disp: , Rfl:   •  lamoTRIgine (LaMICtal) 100 MG tablet, Take 100 mg by mouth Daily., Disp: , Rfl:   •  melatonin 5 MG tablet tablet, Take 10 mg by mouth At Night As Needed., Disp: , Rfl:       Nutrition Assessment    Estimated energy needs: 3000    Estimated calories for weight loss: 1800    IBW (Pounds):  200      Excess body weight (Pounds): 246       Nutrition Recall  24 Hour recall: (B) (L) (D) -  Reviewed and discussed with patient       Exercise  never      Education    Provided handouts including dietary guidelines for Sleeve Gastrectomy    Provided follow-up options for support, including contact information for dietitians here, if desired.  Web-based support information and apps for smart phones and computers given.  Noted that monthly support group is offered at this clinic, and that support is associated with weight loss.    Recommend that team proceed " with surgery and follow per protocol.      Nutrition Goals   Dietary Guidelines per manual  Protein goal:  grams per day   Eliminate soda    Exercise Goals  Add 15-30 minutes of activity per day as tolerated        Kary Kang RD  06/27/2018  11:39 AM

## 2018-07-09 RX ORDER — MELOXICAM 15 MG/1
TABLET ORAL
Qty: 30 TABLET | Refills: 0 | OUTPATIENT
Start: 2018-07-09

## 2018-08-02 ENCOUNTER — OFFICE VISIT (OUTPATIENT)
Dept: ORTHOPEDIC SURGERY | Facility: CLINIC | Age: 33
End: 2018-08-02

## 2018-08-02 VITALS — WEIGHT: 293 LBS | RESPIRATION RATE: 18 BRPM | BODY MASS INDEX: 45.99 KG/M2 | HEIGHT: 67 IN

## 2018-08-02 DIAGNOSIS — S86.912D STRAIN OF LEFT KNEE AND LEG, SUBSEQUENT ENCOUNTER: Primary | ICD-10-CM

## 2018-08-02 DIAGNOSIS — E66.01 MORBID OBESITY (HCC): ICD-10-CM

## 2018-08-02 PROCEDURE — 99213 OFFICE O/P EST LOW 20 MIN: CPT | Performed by: ORTHOPAEDIC SURGERY

## 2018-08-02 NOTE — PROGRESS NOTES
"Subjective   Patient ID: Jane Smith is a 32 y.o. female  Follow-up and Pain of the Left Knee (Patient is here today for her MRI results and states she is in some pain today.)             History of Present Illness    32-year-old with body weight of 457 pounds today complains of left anterior knee pain weakness in both knees trouble getting out of chairs, recent MR confirm probable strain ACL left knee with posterior lateral left knee strain no sign of meniscal tear bone bruise or fracture.  She states her pain is constant burning worse with use better with rest not improve with rest much.    Review of Systems   Constitutional: Negative for fever.   HENT: Negative for voice change.    Eyes: Negative for visual disturbance.   Respiratory: Negative for shortness of breath.    Cardiovascular: Negative for chest pain.   Gastrointestinal: Negative for abdominal distention and abdominal pain.   Genitourinary: Negative for dysuria.   Musculoskeletal: Positive for arthralgias. Negative for gait problem and joint swelling.   Skin: Negative for rash.   Neurological: Negative for speech difficulty.   Hematological: Does not bruise/bleed easily.   Psychiatric/Behavioral: Negative for confusion.       Past Medical History:   Diagnosis Date   • Chiari malformation type I (CMS/HCC)    • Dyspepsia    • Dyspnea on exertion    • Fatigue    • Insomnia    • Mental health disorder     \"schizo bipolar\"   • Morbid obesity (CMS/HCC)    • Suicide attempt     2017 - hospitalized         Past Surgical History:   Procedure Laterality Date   • CERVICAL CHIARI DECOMPRESSION POSTERIOR  2016    complicated by spinal leak   •  SECTION     • LAPAROSCOPIC CHOLECYSTECTOMY      for gangrene, no stones   • REDUCTION MAMMAPLASTY         Family History   Problem Relation Age of Onset   • Hypertension Mother    • Hyperlipidemia Mother    • Hypertension Father    • Diabetes Father    • Hyperlipidemia Father        Social " "History     Social History   • Marital status:      Spouse name: N/A   • Number of children: N/A   • Years of education: N/A     Occupational History   • Not on file.     Social History Main Topics   • Smoking status: Never Smoker   • Smokeless tobacco: Never Used      Comment:  smokes outside   • Alcohol use No   • Drug use: No   • Sexual activity: Defer     Other Topics Concern   • Not on file     Social History Narrative    Lives in [place], [place] w/  and 5 y/o daughter.  Stay-at-home mom.         I have reviewed all of the above social hx, family hx, surgical hx, medications, allergies & ROS and confirm that it is accurate.    Allergies   Allergen Reactions   • Ciprofloxacin Nausea And Vomiting   • Aspirin GI Intolerance         Current Outpatient Prescriptions:   •  amitriptyline (ELAVIL) 50 MG tablet, , Disp: , Rfl:   •  busPIRone (BUSPAR) 15 MG tablet, , Disp: , Rfl:   •  DULoxetine (CYMBALTA) 60 MG capsule, Take 60 mg by mouth Daily., Disp: , Rfl:   •  lamoTRIgine (LaMICtal) 100 MG tablet, Take 100 mg by mouth Daily., Disp: , Rfl:   •  melatonin 5 MG tablet tablet, Take 10 mg by mouth At Night As Needed., Disp: , Rfl:     Objective   Resp 18   Ht 170.2 cm (67\")   Wt (!) 207 kg (457 lb)   BMI 71.58 kg/m²    Physical Exam  Constitutional: Patient is oriented to person, place, and time. Patient appears well-developed and well-nourished.   HENT:Head: Normocephalic and atraumatic.   Eyes: EOM are normal. Pupils are equal, round, and reactive to light.   Neck: Normal range of motion. Neck supple.   Cardiovascular: Normal rate.    Pulmonary/Chest: Effort normal and breath sounds normal.   Abdominal: Soft.   Neurological: Patient is alert and oriented to person, place, and time.   Skin: Skin is warm and dry.   Psychiatric: Patient has a normal mood and affect.   Nursing note and vitals reviewed.       Ortho Exam   Left knee with full range of motion very slight laxity with ACL stress minimal " posterior lateral joint line pain no ecchymosis contusions or abrasions calf supple no sign of significant edema left ankle full left knee range of motion, limited ability to even stand up or do a squat on either leg due to bilateral quadriceps dysfunction    Assessment/Plan   Review of Radiographic Studies:    No new imaging done today.      Procedures     Jane was seen today for follow-up and pain.    Diagnoses and all orders for this visit:    Strain of left knee and leg, subsequent encounter    Morbid obesity (CMS/formerly [facility])       Physical therapy referral given      Recommendations/Plan:   Work/Activity Status: May perform usual activities as tolerated    Patient agreeable to call or return sooner for any concerns.        Reviewed findings on the MR explained to her and her  the nature of the condition natural history treatment options pros and cons of options of care.  Given her the MRI over 70 did not recommend surgical treatment.      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

## 2018-08-08 ENCOUNTER — TELEPHONE (OUTPATIENT)
Dept: ORTHOPEDIC SURGERY | Facility: CLINIC | Age: 33
End: 2018-08-08

## 2018-08-09 NOTE — TELEPHONE ENCOUNTER
Spoke with patient to let her know Dr. Benjamin said a cortisone injection would not help her and that if her weight loss dr thought it would than he can do that on his own will, but he wouldn't write a letter saying it would be beneficial when he doesn't think it would be.